# Patient Record
Sex: FEMALE | Race: OTHER | Employment: FULL TIME | URBAN - METROPOLITAN AREA
[De-identification: names, ages, dates, MRNs, and addresses within clinical notes are randomized per-mention and may not be internally consistent; named-entity substitution may affect disease eponyms.]

---

## 2017-09-25 PROBLEM — N92.0 MENORRHAGIA WITH REGULAR CYCLE: Status: ACTIVE | Noted: 2017-09-25

## 2017-09-29 ENCOUNTER — HOSPITAL ENCOUNTER (OUTPATIENT)
Dept: MAMMOGRAPHY | Age: 42
Discharge: HOME | End: 2017-09-29
Attending: OBSTETRICS & GYNECOLOGY
Payer: COMMERCIAL

## 2017-09-29 DIAGNOSIS — Z12.31 VISIT FOR SCREENING MAMMOGRAM: ICD-10-CM

## 2017-09-29 PROCEDURE — 77067 SCR MAMMO BI INCL CAD: CPT

## 2017-10-19 NOTE — H&P
Gynecology History and Physical    Name: Eladio Light MRN: [id number] SSN: [id number]    YOB: 1975  Age: 38 y.o. Sex: female       Subjective:      Chief complaint:  Abnormal uterine bleeding    Lazarus Landmark is a 38 y.o.  female with a history of abnormal uterine bleeding. Previous workup included Ultrasound which was unremarkable. Previous treatment measures included hormone therapy and nonsteroidal anti-inflammatory drugs (NSAIDs). She is admitted for Procedure(s) (LRB):  DILATATION AND CURETTAGE HYSTEROSCOPY ENDOMETRIAL ABLATION/NOVASURE (N/A). The current method of family planning is Essure. OB History      Para Term  AB Living    4 2 2  2 2    SAB TAB Ectopic Molar Multiple Live Births    1  1   2        Past Medical History:   Diagnosis Date    Acquired hypothyroidism     history of hypothyroidism, resolved after getting pregnancy    Heavy periods     History of ectopic pregnancy 2009    methotrexate    Infertility     famera, clomid, injections, IUI     Past Surgical History:   Procedure Laterality Date     DELIVERY ONLY      HX APPENDECTOMY  13    HX  SECTION  2011    HX GYN      Essure    HX OTHER SURGICAL  2009    nasal surgery     Social History     Occupational History    Not on file. Social History Main Topics    Smoking status: Never Smoker    Smokeless tobacco: Never Used    Alcohol use Yes      Comment: occ    Drug use: No    Sexual activity: Yes     Partners: Male     Birth control/ protection: Surgical      Comment: essure      Family History   Problem Relation Age of Onset    Diabetes Father     Cancer Paternal Aunt      breast    Breast Cancer Neg Hx         No Known Allergies  Prior to Admission medications    Medication Sig Start Date End Date Taking? Authorizing Provider   phentermine (ADIPEX-P) 37.5 mg tablet Take 37.5 mg by mouth every morning.     Historical Provider        Review of Systems:  A comprehensive review of systems was negative except for that written in the History of Present Illness. Objective: There were no vitals filed for this visit. Physical Exam:  Patient without distress. Heart: Regular rate and rhythm  Lung: clear to auscultation throughout lung fields, no wheezes, no rales, no rhonchi and normal respiratory effort    Assessment:     Active Problems:    * No active hospital problems. *     Abnormal uterine bleeding     Plan:     Procedure(s) (LRB):  DILATATION AND CURETTAGE HYSTEROSCOPY ENDOMETRIAL ABLATION/NOVASURE (N/A)  Discussed the risks of surgery including the risks of bleeding, infection, deep vein thrombosis, and surgical injuries to internal organs including but not limited to the bowels, bladder, rectum, and female reproductive organs. The patient understands the risks; any and all questions were answered to the patient's satisfaction.     Signed By:  Yadi Abebe MD     October 19, 2017

## 2017-10-24 ENCOUNTER — HOSPITAL ENCOUNTER (OUTPATIENT)
Dept: SURGERY | Age: 42
Discharge: HOME | End: 2017-10-24

## 2017-10-27 VITALS — BODY MASS INDEX: 24.75 KG/M2 | WEIGHT: 145 LBS | HEIGHT: 64 IN

## 2017-10-27 RX ORDER — BISMUTH SUBSALICYLATE 262 MG
1 TABLET,CHEWABLE ORAL
COMMUNITY
End: 2017-11-20

## 2017-10-27 NOTE — PERIOP NOTES
Patient verified name, , and surgery as listed in [facility]. Type 1B surgery, phone assessment complete. Orders in EMR- received. Labs per surgeon: none  Labs per anesthesia protocol: none; hgb collected 17- result WNL    Patient answered medical/surgical history questions at their best of ability. All prior to admission medications documented in [facility]. Patient instructed to take the following medications the day of surgery according to anesthesia guidelines with a small sip of water: none. Hold all vitamins 7 days prior to surgery and NSAIDS 5 days prior to surgery. Medications to be held- vitamin. Patient instructed on the following:  Arrive at A Entrance, time of arrival to be called the day before by 1700  NPO after midnight including gum, mints, and ice chips  Responsible adult must drive patient to the hospital, stay during surgery, and patient will  need supervision 24 hours after anesthesia  Use antibacterial soap in shower the night before surgery and on the morning of surgery  Leave all valuables (money and jewelry) at home but bring insurance card and ID on  DOS  Do not wear make-up, nail polish, lotions, cologne, perfumes, powders, or oil on skin. Patient teach back successful and patient demonstrates knowledge of instruction.

## 2017-10-30 ENCOUNTER — ANESTHESIA EVENT (OUTPATIENT)
Dept: SURGERY | Age: 42
End: 2017-10-30
Payer: COMMERCIAL

## 2017-10-31 ENCOUNTER — HOSPITAL ENCOUNTER (OUTPATIENT)
Age: 42
Setting detail: OUTPATIENT SURGERY
Discharge: HOME | End: 2017-10-31
Attending: OBSTETRICS & GYNECOLOGY | Admitting: OBSTETRICS & GYNECOLOGY
Payer: COMMERCIAL

## 2017-10-31 ENCOUNTER — ANESTHESIA (OUTPATIENT)
Dept: SURGERY | Age: 42
End: 2017-10-31
Payer: COMMERCIAL

## 2017-10-31 VITALS
TEMPERATURE: 98.3 F | OXYGEN SATURATION: 99 % | HEART RATE: 62 BPM | DIASTOLIC BLOOD PRESSURE: 75 MMHG | BODY MASS INDEX: 25.5 KG/M2 | WEIGHT: 148.56 LBS | RESPIRATION RATE: 16 BRPM | SYSTOLIC BLOOD PRESSURE: 117 MMHG

## 2017-10-31 DIAGNOSIS — N92.0 MENORRHAGIA WITH REGULAR CYCLE: Primary | ICD-10-CM

## 2017-10-31 LAB — HCG UR QL: NEGATIVE

## 2017-10-31 PROCEDURE — 74011250636 HC RX REV CODE- 250/636

## 2017-10-31 PROCEDURE — 77030020782 HC GWN BAIR PAWS FLX 3M -B: Performed by: ANESTHESIOLOGY

## 2017-10-31 PROCEDURE — 77030034928 HC DEV UTER SURSND KT HOLO -G1: Performed by: OBSTETRICS & GYNECOLOGY

## 2017-10-31 PROCEDURE — 76210000016 HC OR PH I REC 1 TO 1.5 HR: Performed by: OBSTETRICS & GYNECOLOGY

## 2017-10-31 PROCEDURE — 77030018836 HC SOL IRR NACL ICUM -A: Performed by: OBSTETRICS & GYNECOLOGY

## 2017-10-31 PROCEDURE — 76210000020 HC REC RM PH II FIRST 0.5 HR: Performed by: OBSTETRICS & GYNECOLOGY

## 2017-10-31 PROCEDURE — 76060000032 HC ANESTHESIA 0.5 TO 1 HR: Performed by: OBSTETRICS & GYNECOLOGY

## 2017-10-31 PROCEDURE — 81025 URINE PREGNANCY TEST: CPT

## 2017-10-31 PROCEDURE — 74011000250 HC RX REV CODE- 250

## 2017-10-31 PROCEDURE — 77030020143 HC AIRWY LARYN INTUB CGAS -A: Performed by: ANESTHESIOLOGY

## 2017-10-31 PROCEDURE — 76010000138 HC OR TIME 0.5 TO 1 HR: Performed by: OBSTETRICS & GYNECOLOGY

## 2017-10-31 PROCEDURE — 88305 TISSUE EXAM BY PATHOLOGIST: CPT | Performed by: OBSTETRICS & GYNECOLOGY

## 2017-10-31 PROCEDURE — 74011250636 HC RX REV CODE- 250/636: Performed by: ANESTHESIOLOGY

## 2017-10-31 RX ORDER — SODIUM CHLORIDE 0.9 % (FLUSH) 0.9 %
5-10 SYRINGE (ML) INJECTION EVERY 8 HOURS
Status: DISCONTINUED | OUTPATIENT
Start: 2017-10-31 | End: 2017-10-31 | Stop reason: HOSPADM

## 2017-10-31 RX ORDER — KETOROLAC TROMETHAMINE 30 MG/ML
INJECTION, SOLUTION INTRAMUSCULAR; INTRAVENOUS AS NEEDED
Status: DISCONTINUED | OUTPATIENT
Start: 2017-10-31 | End: 2017-10-31 | Stop reason: HOSPADM

## 2017-10-31 RX ORDER — OXYCODONE HYDROCHLORIDE 5 MG/1
5 TABLET ORAL
Status: DISCONTINUED | OUTPATIENT
Start: 2017-10-31 | End: 2017-10-31 | Stop reason: HOSPADM

## 2017-10-31 RX ORDER — PROPOFOL 10 MG/ML
INJECTION, EMULSION INTRAVENOUS AS NEEDED
Status: DISCONTINUED | OUTPATIENT
Start: 2017-10-31 | End: 2017-10-31 | Stop reason: HOSPADM

## 2017-10-31 RX ORDER — LIDOCAINE HYDROCHLORIDE 20 MG/ML
INJECTION, SOLUTION EPIDURAL; INFILTRATION; INTRACAUDAL; PERINEURAL AS NEEDED
Status: DISCONTINUED | OUTPATIENT
Start: 2017-10-31 | End: 2017-10-31 | Stop reason: HOSPADM

## 2017-10-31 RX ORDER — HYDROMORPHONE HYDROCHLORIDE 2 MG/ML
0.5 INJECTION, SOLUTION INTRAMUSCULAR; INTRAVENOUS; SUBCUTANEOUS
Status: DISCONTINUED | OUTPATIENT
Start: 2017-10-31 | End: 2017-10-31 | Stop reason: HOSPADM

## 2017-10-31 RX ORDER — OXYCODONE AND ACETAMINOPHEN 5; 325 MG/1; MG/1
1 TABLET ORAL
Qty: 8 TAB | Refills: 0 | Status: SHIPPED | OUTPATIENT
Start: 2017-10-31 | End: 2017-11-20

## 2017-10-31 RX ORDER — SODIUM CHLORIDE 0.9 % (FLUSH) 0.9 %
5-10 SYRINGE (ML) INJECTION AS NEEDED
Status: DISCONTINUED | OUTPATIENT
Start: 2017-10-31 | End: 2017-10-31 | Stop reason: HOSPADM

## 2017-10-31 RX ORDER — ONDANSETRON 2 MG/ML
INJECTION INTRAMUSCULAR; INTRAVENOUS AS NEEDED
Status: DISCONTINUED | OUTPATIENT
Start: 2017-10-31 | End: 2017-10-31 | Stop reason: HOSPADM

## 2017-10-31 RX ORDER — OXYCODONE AND ACETAMINOPHEN 10; 325 MG/1; MG/1
1 TABLET ORAL AS NEEDED
Status: DISCONTINUED | OUTPATIENT
Start: 2017-10-31 | End: 2017-10-31 | Stop reason: HOSPADM

## 2017-10-31 RX ORDER — FENTANYL CITRATE 50 UG/ML
INJECTION, SOLUTION INTRAMUSCULAR; INTRAVENOUS AS NEEDED
Status: DISCONTINUED | OUTPATIENT
Start: 2017-10-31 | End: 2017-10-31 | Stop reason: HOSPADM

## 2017-10-31 RX ORDER — DEXAMETHASONE SODIUM PHOSPHATE 4 MG/ML
INJECTION, SOLUTION INTRA-ARTICULAR; INTRALESIONAL; INTRAMUSCULAR; INTRAVENOUS; SOFT TISSUE AS NEEDED
Status: DISCONTINUED | OUTPATIENT
Start: 2017-10-31 | End: 2017-10-31 | Stop reason: HOSPADM

## 2017-10-31 RX ORDER — SODIUM CHLORIDE, SODIUM LACTATE, POTASSIUM CHLORIDE, CALCIUM CHLORIDE 600; 310; 30; 20 MG/100ML; MG/100ML; MG/100ML; MG/100ML
75 INJECTION, SOLUTION INTRAVENOUS CONTINUOUS
Status: DISCONTINUED | OUTPATIENT
Start: 2017-10-31 | End: 2017-10-31 | Stop reason: HOSPADM

## 2017-10-31 RX ADMIN — DEXAMETHASONE SODIUM PHOSPHATE 4 MG: 4 INJECTION, SOLUTION INTRA-ARTICULAR; INTRALESIONAL; INTRAMUSCULAR; INTRAVENOUS; SOFT TISSUE at 08:38

## 2017-10-31 RX ADMIN — PROPOFOL 200 MG: 10 INJECTION, EMULSION INTRAVENOUS at 08:35

## 2017-10-31 RX ADMIN — ONDANSETRON 4 MG: 2 INJECTION INTRAMUSCULAR; INTRAVENOUS at 08:38

## 2017-10-31 RX ADMIN — FENTANYL CITRATE 25 MCG: 50 INJECTION, SOLUTION INTRAMUSCULAR; INTRAVENOUS at 08:45

## 2017-10-31 RX ADMIN — SODIUM CHLORIDE, SODIUM LACTATE, POTASSIUM CHLORIDE, AND CALCIUM CHLORIDE 75 ML/HR: 600; 310; 30; 20 INJECTION, SOLUTION INTRAVENOUS at 07:19

## 2017-10-31 RX ADMIN — HYDROMORPHONE HYDROCHLORIDE 0.5 MG: 2 INJECTION, SOLUTION INTRAMUSCULAR; INTRAVENOUS; SUBCUTANEOUS at 09:45

## 2017-10-31 RX ADMIN — LIDOCAINE HYDROCHLORIDE 80 MG: 20 INJECTION, SOLUTION EPIDURAL; INFILTRATION; INTRACAUDAL; PERINEURAL at 08:35

## 2017-10-31 RX ADMIN — FENTANYL CITRATE 50 MCG: 50 INJECTION, SOLUTION INTRAMUSCULAR; INTRAVENOUS at 08:37

## 2017-10-31 RX ADMIN — FENTANYL CITRATE 25 MCG: 50 INJECTION, SOLUTION INTRAMUSCULAR; INTRAVENOUS at 08:56

## 2017-10-31 RX ADMIN — KETOROLAC TROMETHAMINE 30 MG: 30 INJECTION, SOLUTION INTRAMUSCULAR; INTRAVENOUS at 08:38

## 2017-10-31 NOTE — OP NOTES
HYSTEROSCOPY D & C WITH ENDOMETRIAL ABLATION FULL OP NOTE    Cher Lutz  [id number]    DATE OF PROCEDURE:  10/31/2017    PREOPERATIVE DIAGNOSIS:  Menorrhagia with regular cycle [N92.0]    POSTOPERATIVE DIAGNOSIS:  Menorrhagia with regular cycle [N92.0]    PROCEDURE: Hysteroscopy, dilatation & curettage, NovaSure ablation. SURGEON:  Maty Shin MD    ASSISTANT:  none    ANESTHESIA:   General endotracheal anesthesia. EBL: 5 cc    FINDINGS: normal cavity and good post ablation appearance    PROCEDURE: Patient was placed on the operating table in the supine position. Time out was done to confirm the operating procedure, surgeon, patient and site. Once confirmed by the team, procedure was started. Patient was placed under general endotracheal anesthesia. She was prepped and draped in the usual fashion for vaginal surgery. Cervix was visualized with the aid of a weighted vaginal speculum and grasped with a single-tooth tenaculum and sounded to 10 cm. The cervix was then dilated to 23-Latvian. The diagnostic hysteroscope was then placed in the endometrial cavity. Thorough endometrial curettage was performed with endometrial curettings being sent for histologic review, using a medium sharp curette. The NovaSure ablation device was then opened. The uterus sounded to 10 cm with cervical length of 5 cm. The settings on the NovaSure ablation were set for a 1ength of 5 cm and a width of 4.7 cm with treatment for 56 sec at a power level of 129 after the instrument was placed and tested. The NovaSure ablater was then removed. Repeat hysteroscopy revealed a very adequate endometrial ablation. There was no bleeding. Instruments were removed. The patient went to the recovery room in satisfactory condition. She was given a prescription for percocet 5 #8, is to follow up in two weeks, and will call with any increased pain, fever, or bleeding.

## 2017-10-31 NOTE — ANESTHESIA PREPROCEDURE EVALUATION
Anesthetic History   No history of anesthetic complications            Review of Systems / Medical History  Patient summary reviewed and pertinent labs reviewed    Pulmonary  Within defined limits                 Neuro/Psych   Within defined limits           Cardiovascular                  Exercise tolerance: >4 METS     GI/Hepatic/Renal  Within defined limits              Endo/Other      Hypothyroidism      Comments: Hypothyroidism during one pregnancy per pt.  Resolved now Other Findings              Physical Exam    Airway  Mallampati: II  TM Distance: > 6 cm  Neck ROM: normal range of motion   Mouth opening: Normal     Cardiovascular    Rhythm: regular           Dental  No notable dental hx       Pulmonary                 Abdominal  GI exam deferred       Other Findings            Anesthetic Plan    ASA: 2  Anesthesia type: general          Induction: Intravenous  Anesthetic plan and risks discussed with: Patient

## 2017-10-31 NOTE — IP AVS SNAPSHOT
[phone] 
[phone] Patient: Davida Anguiano MRN: [id number] :1975 About your hospitalization You were admitted on:  2017 You last received care in the:  [facility] PACU You were discharged on:  2017 Why you were hospitalized Your primary diagnosis was:  Not on File Things You Need To Do (next 8 weeks) Follow up with Yadi Abebe MD  
  
Phone:  [phone] Where:  [address] [place] [address], [address], [address] [place] [address]  POST OP with Yadi Abebe MD at  1:45 PM  
Where:  [address] Discharge Orders Procedure Order Date Status Priority Quantity Spec Type Associated Dx ACTIVITY AFTER DISCHARGE Patient should: Restrict sexual activity. , Restrict lifting, Restrict driving 44 8504 Normal Routine 1 Questions: Patient should:  Restrict sexual activity. Patient should:  Restrict lifting Patient should:  Restrict driving DIET REGULAR No added salt 10/31/17 0903 Normal Routine 1 Questions: Additional options:  No added salt A check ashley indicates which time of day the medication should be taken. My Medications TAKE these medications as instructed Instructions Each Dose to Equal  
 Morning Noon Evening Bedtime  
 multivitamin tablet Commonly known as:  ONE A DAY Your last dose was: Your next dose is: Take 1 Tab by mouth daily as needed. 1 Tab  
    
   
   
   
  
 oxyCODONE-acetaminophen 5-325 mg per tablet Commonly known as:  PERCOCET Your last dose was: Your next dose is: Take 1 Tab by mouth every four (4) hours as needed for Pain. Max Daily Amount: 6 Tabs. 1 Tab  
    
   
   
   
  
 phentermine 37.5 mg tablet Commonly known as:  ADIPEX-P  
 Your last dose was: Your next dose is: Take 37.5 mg by mouth every morning. 37.5 mg Where to Get Your Medications Information on where to get these meds will be given to you by the nurse or doctor. ! Ask your nurse or doctor about these medications  
  oxyCODONE-acetaminophen 5-325 mg per tablet Discharge Instructions INSTRUCTIONS FOLLOWING HYSTEROSCOPY 
 
ACTIVITY  Limited activity today; increase as tolerated tomorrow, but no vigorous exercise  Shower only; no tub baths  No douches, tampons or intercourse until your doctor releases you (at least 2 weeks)  May return to work or school as directed by your doctor DIET  Clear liquids until no nausea or vomiting  Advance to regular diet as tolerated PAIN 
 Expect a moderate amount of pain.  Take pain medication as directed by your doctor. If no prescription for pain is sent home with you, take the appropriate dose of your commonly used pain medication.  Call your doctor if pain is NOT relieved by medication.  DO NOT take aspirin or blood thinners until directed by your doctor. FOLLOW-UP PHONE CALLS  Calls will be made by nursing staff  If you have any problems, call your doctor as needed. CALL YOUR DOCTOR IF 
 Excessive bleeding that soaks a pad in an hour  Temperature of 101°F or above  Green or yellow, smelly discharge  Unable to urinate by bedtime  Nausea and vomiting that does not stop by bedtime AFTER ANESTHESIA  For the first 24 hours: DO NOT Drive, Drink alcoholic beverages, or Make important decisions.  Beware of dizziness following anesthesia and while taking pain medication.  Plan to stay tonight within 1 hours drive of the hospital 
 
 
  
  
  
Introducing hospitals & HEALTH SERVICES!    
 Danny Shaffer introduces [employer] patient portal. Now you can access parts of your medical record, email your doctor's office, and request medication refills online. 1. In your internet browser, go to [email or web address] 2. Click on the First Time User? Click Here link in the Sign In box. You will see the New Member Sign Up page. 3. Enter your [employer] Access Code exactly as it appears below. You will not need to use this code after youve completed the sign-up process. If you do not sign up before the expiration date, you must request a new code. · [employer] Access Code: [email or web address] Expires: 1/28/2018  6:16 PM 
 
4. Enter the last four digits of your Social Security Number (xxxx) and Date of Birth (mm/dd/yyyy) as indicated and click Submit. You will be taken to the next sign-up page. 5. Create a [employer] ID. This will be your [employer] login ID and cannot be changed, so think of one that is secure and easy to remember. 6. Create a [employer] password. You can change your password at any time. 7. Enter your Password Reset Question and Answer. This can be used at a later time if you forget your password. 8. Enter your e-mail address. You will receive e-mail notification when new information is available in [address] 9. Click Sign Up. You can now view and download portions of your medical record. 10. Click the Download Summary menu link to download a portable copy of your medical information. If you have questions, please visit the Frequently Asked Questions section of the [employer] website. Remember, [employer] is NOT to be used for urgent needs. For medical emergencies, dial 911. Now available from your iPhone and Android! Providers Seen During Your Hospitalization Provider Specialty Primary office phone Orville Madrigal MD Obstetrics & Gynecology [phone] Your Primary Care Physician (PCP) Primary Care Physician Office Phone Office Fax Donnie Monahan [phone] You are allergic to the following No active allergies Recent Documentation Weight BMI OB Status Smoking Status 67.4 kg 25.5 kg/m2 Having regular periods Never Smoker Emergency Contacts Name Discharge Info Relation Home Work Mobile Floyd Rico  Spouse [3] [phone] Patient Belongings The following personal items are in your possession at time of discharge: 
                             
 
  
  
 Please provide this summary of care documentation to your next provider. Signatures-by signing, you are acknowledging that this After Visit Summary has been reviewed with you and you have received a copy. Patient Signature:  ____________________________________________________________ Date:  ____________________________________________________________  
  
[facility] Provider Signature:  ____________________________________________________________ Date:  ____________________________________________________________

## 2017-10-31 NOTE — ANESTHESIA POSTPROCEDURE EVALUATION
Post-Anesthesia Evaluation and Assessment    Patient: Jay Evans MRN: [id number]  SSN: [id number]    YOB: 1975  Age: 38 y.o. Sex: female       Cardiovascular Function/Vital Signs  Visit Vitals    /79 (BP 1 Location: Left arm, BP Patient Position: At rest)    Pulse 62    Temp 36.8 °C (98.3 °F)    Resp 16    Wt 67.4 kg (148 lb 9 oz)    SpO2 99%    BMI 25.5 kg/m2       Patient is status post general anesthesia for Procedure(s):  DILATATION AND CURETTAGE HYSTEROSCOPY ENDOMETRIAL ABLATION/NOVASURE. Nausea/Vomiting: None    Postoperative hydration reviewed and adequate. Pain:  Pain Scale 1: Visual (10/31/17 0909)  Pain Intensity 1: 0 (10/31/17 0909)   Managed    Neurological Status:   Neuro (WDL): Exceptions to [facility] (10/31/17 0909)  Neuro  Neurologic State: Drowsy (10/31/17 6074)  Orientation Level: Oriented to person (10/31/17 0909)  Cognition: Follows commands (10/31/17 0909)  LUE Motor Response: Purposeful (10/31/17 0909)  LLE Motor Response: Purposeful (10/31/17 0909)  RUE Motor Response: Purposeful (10/31/17 0909)  RLE Motor Response: Purposeful (10/31/17 0909)   At baseline    Mental Status and Level of Consciousness: Arousable    Pulmonary Status:   O2 Device: Room air (10/31/17 0954)   Adequate oxygenation and airway patent    Complications related to anesthesia: None    Post-anesthesia assessment completed.  No concerns    Signed By: Abby Burroughs MD     October 31, 2017

## 2018-04-16 ENCOUNTER — HOSPITAL ENCOUNTER (EMERGENCY)
Age: 43
Discharge: HOME | End: 2018-04-16
Attending: EMERGENCY MEDICINE
Payer: COMMERCIAL

## 2018-04-16 ENCOUNTER — APPOINTMENT (OUTPATIENT)
Dept: GENERAL RADIOLOGY | Age: 43
End: 2018-04-16
Attending: EMERGENCY MEDICINE
Payer: COMMERCIAL

## 2018-04-16 VITALS
WEIGHT: 150 LBS | RESPIRATION RATE: 16 BRPM | DIASTOLIC BLOOD PRESSURE: 70 MMHG | OXYGEN SATURATION: 100 % | BODY MASS INDEX: 25.61 KG/M2 | SYSTOLIC BLOOD PRESSURE: 128 MMHG | HEART RATE: 65 BPM | HEIGHT: 64 IN | TEMPERATURE: 97.8 F

## 2018-04-16 DIAGNOSIS — R07.9 CHEST PAIN, UNSPECIFIED TYPE: Primary | ICD-10-CM

## 2018-04-16 LAB
ALBUMIN SERPL-MCNC: 3.7 G/DL (ref 3.5–5)
ALBUMIN/GLOB SERPL: 1 {RATIO} (ref 1.2–3.5)
ALP SERPL-CCNC: 57 U/L (ref 50–136)
ALT SERPL-CCNC: 39 U/L (ref 12–65)
ANION GAP SERPL CALC-SCNC: 7 MMOL/L (ref 7–16)
AST SERPL-CCNC: 25 U/L (ref 15–37)
ATRIAL RATE: 70 BPM
BACTERIA URNS QL MICRO: ABNORMAL /HPF
BASOPHILS # BLD: 0 K/UL (ref 0–0.2)
BASOPHILS NFR BLD: 0 % (ref 0–2)
BILIRUB SERPL-MCNC: 0.2 MG/DL (ref 0.2–1.1)
BUN SERPL-MCNC: 14 MG/DL (ref 6–23)
CALCIUM SERPL-MCNC: 8.5 MG/DL (ref 8.3–10.4)
CALCULATED P AXIS, ECG09: -16 DEGREES
CALCULATED R AXIS, ECG10: 75 DEGREES
CALCULATED T AXIS, ECG11: 59 DEGREES
CASTS URNS QL MICRO: ABNORMAL /LPF
CHLORIDE SERPL-SCNC: 108 MMOL/L (ref 98–107)
CO2 SERPL-SCNC: 24 MMOL/L (ref 21–32)
CREAT SERPL-MCNC: 0.63 MG/DL (ref 0.6–1)
D DIMER PPP FEU-MCNC: 0.33 UG/ML(FEU)
DIAGNOSIS, 93000: NORMAL
DIFFERENTIAL METHOD BLD: NORMAL
EOSINOPHIL # BLD: 0.1 K/UL (ref 0–0.8)
EOSINOPHIL NFR BLD: 1 % (ref 0.5–7.8)
EPI CELLS #/AREA URNS HPF: ABNORMAL /HPF
ERYTHROCYTE [DISTWIDTH] IN BLOOD BY AUTOMATED COUNT: 12.6 % (ref 11.9–14.6)
GLOBULIN SER CALC-MCNC: 3.8 G/DL (ref 2.3–3.5)
GLUCOSE SERPL-MCNC: 108 MG/DL (ref 65–100)
HCT VFR BLD AUTO: 38.6 % (ref 35.8–46.3)
HGB BLD-MCNC: 13.1 G/DL (ref 11.7–15.4)
IMM GRANULOCYTES # BLD: 0 K/UL (ref 0–0.5)
IMM GRANULOCYTES NFR BLD AUTO: 0 % (ref 0–5)
LIPASE SERPL-CCNC: 180 U/L (ref 73–393)
LYMPHOCYTES # BLD: 2.5 K/UL (ref 0.5–4.6)
LYMPHOCYTES NFR BLD: 32 % (ref 13–44)
MCH RBC QN AUTO: 31.6 PG (ref 26.1–32.9)
MCHC RBC AUTO-ENTMCNC: 33.9 G/DL (ref 31.4–35)
MCV RBC AUTO: 93.2 FL (ref 79.6–97.8)
MONOCYTES # BLD: 0.7 K/UL (ref 0.1–1.3)
MONOCYTES NFR BLD: 8 % (ref 4–12)
NEUTS SEG # BLD: 4.5 K/UL (ref 1.7–8.2)
NEUTS SEG NFR BLD: 59 % (ref 43–78)
P-R INTERVAL, ECG05: 162 MS
PLATELET # BLD AUTO: 270 K/UL (ref 150–450)
PMV BLD AUTO: 11.3 FL (ref 10.8–14.1)
POTASSIUM SERPL-SCNC: 4.1 MMOL/L (ref 3.5–5.1)
PROT SERPL-MCNC: 7.5 G/DL (ref 6.3–8.2)
Q-T INTERVAL, ECG07: 396 MS
QRS DURATION, ECG06: 94 MS
QTC CALCULATION (BEZET), ECG08: 427 MS
RBC # BLD AUTO: 4.14 M/UL (ref 4.05–5.25)
RBC #/AREA URNS HPF: ABNORMAL /HPF
SODIUM SERPL-SCNC: 139 MMOL/L (ref 136–145)
TROPONIN I BLD-MCNC: 0 NG/ML (ref 0.02–0.05)
TROPONIN I BLD-MCNC: 0 NG/ML (ref 0.02–0.05)
TROPONIN I SERPL-MCNC: <0.02 NG/ML (ref 0.02–0.05)
VENTRICULAR RATE, ECG03: 70 BPM
WBC # BLD AUTO: 7.8 K/UL (ref 4.3–11.1)
WBC URNS QL MICRO: ABNORMAL /HPF

## 2018-04-16 PROCEDURE — 85025 COMPLETE CBC W/AUTO DIFF WBC: CPT | Performed by: EMERGENCY MEDICINE

## 2018-04-16 PROCEDURE — 85379 FIBRIN DEGRADATION QUANT: CPT | Performed by: EMERGENCY MEDICINE

## 2018-04-16 PROCEDURE — 81003 URINALYSIS AUTO W/O SCOPE: CPT | Performed by: EMERGENCY MEDICINE

## 2018-04-16 PROCEDURE — 83690 ASSAY OF LIPASE: CPT | Performed by: EMERGENCY MEDICINE

## 2018-04-16 PROCEDURE — 84484 ASSAY OF TROPONIN QUANT: CPT

## 2018-04-16 PROCEDURE — 81015 MICROSCOPIC EXAM OF URINE: CPT | Performed by: EMERGENCY MEDICINE

## 2018-04-16 PROCEDURE — 84484 ASSAY OF TROPONIN QUANT: CPT | Performed by: EMERGENCY MEDICINE

## 2018-04-16 PROCEDURE — 99285 EMERGENCY DEPT VISIT HI MDM: CPT | Performed by: EMERGENCY MEDICINE

## 2018-04-16 PROCEDURE — 71046 X-RAY EXAM CHEST 2 VIEWS: CPT

## 2018-04-16 PROCEDURE — 93005 ELECTROCARDIOGRAM TRACING: CPT | Performed by: EMERGENCY MEDICINE

## 2018-04-16 PROCEDURE — 80053 COMPREHEN METABOLIC PANEL: CPT | Performed by: EMERGENCY MEDICINE

## 2018-04-16 RX ORDER — NITROFURANTOIN 25; 75 MG/1; MG/1
100 CAPSULE ORAL 2 TIMES DAILY
Qty: 14 CAP | Refills: 0 | Status: SHIPPED | OUTPATIENT
Start: 2018-04-16 | End: 2018-04-23

## 2018-04-16 NOTE — ED NOTES
I have reviewed discharge instructions with the patient. The patient verbalized understanding. Patient left ED via Discharge Method: ambulatory to Home with (insert name of family/friend, self, transport self). Opportunity for questions and clarification provided. Patient given 1 scripts. To continue your aftercare when you leave the hospital, you may receive an automated call from our care team to check in on how you are doing. This is a free service and part of our promise to provide the best care and service to meet your aftercare needs.  If you have questions, or wish to unsubscribe from this service please call [phone]. Thank you for Choosing our [facility] Emergency Department.

## 2018-04-16 NOTE — ED PROVIDER NOTES
HPI Comments: Seen as provider in triage. Pt developed pain under right breast/back while sitting down for lunch. She got up and walked around which caused worsening pain radiating up to chest. Described as pressure like with SOB and nausea. No diaphoresis. Resolved after 20 min. No fever or cough. given ASA at urgent care. No further testing done. No similar symptoms in past. No medical problems.  had cardiac stent placed in . No tenderness on exam. Labs, EKG, and cxr ordered. Waiting room placement. Nicolas Schmidt MD; 2018 @1:33 PM===========================================      3:53 PM care assumed, agree with history above with no significant additions on my history. Patient is a 38 y.o. female presenting with chest pain. The history is provided by the patient. Chest Pain (Angina)    Pertinent negatives include no abdominal pain, no back pain, no cough, no fever, no headaches, no nausea, no shortness of breath, no vomiting and no weakness. Past Medical History:   Diagnosis Date    Acquired hypothyroidism     history of hypothyroidism, resolved after getting pregnancy    Heavy periods     History of ectopic pregnancy     methotrexate    Infertility     famera, clomid, injections, IUI       Past Surgical History:   Procedure Laterality Date    HX APPENDECTOMY  13    HX  SECTION  04/19/2011    x2    HX DILATION AND CURETTAGE      HX GYN      Essure    HX HYSTEROSCOPY WITH ENDOMETRIAL ABLATION      HX OTHER SURGICAL  2009    nasal surgery    HX REFRACTIVE SURGERY Bilateral          Family History:   Problem Relation Age of Onset    Diabetes Father     Cancer Paternal Aunt      breast    Breast Cancer Neg Hx        Social History     Social History    Marital status:      Spouse name: N/A    Number of children: N/A    Years of education: N/A     Occupational History    Not on file.      Social History Main Topics    Smoking status: Never Smoker  Smokeless tobacco: Never Used    Alcohol use Yes      Comment: socially    Drug use: No    Sexual activity: Yes     Partners: Male     Birth control/ protection: Surgical      Comment: essure      Other Topics Concern    Not on file     Social History Narrative         ALLERGIES: Review of patient's allergies indicates no known allergies. Review of Systems   Constitutional: Negative for chills and fever. HENT: Negative for rhinorrhea and sore throat. Eyes: Negative for visual disturbance. Respiratory: Negative for cough and shortness of breath. Cardiovascular: Positive for chest pain. Negative for leg swelling. Gastrointestinal: Negative for abdominal pain, diarrhea, nausea and vomiting. Genitourinary: Negative for dysuria. Musculoskeletal: Negative for back pain and neck pain. Skin: Negative for rash. Neurological: Negative for weakness and headaches. Psychiatric/Behavioral: The patient is not nervous/anxious. Vitals:    04/16/18 1324   BP: 111/72   Pulse: 73   Resp: 18   Temp: 97.8 °F (36.6 °C)   SpO2: 100%   Weight: 68 kg (150 lb)   Height: 5' 4\" (1.626 m)            Physical Exam   Constitutional: She is oriented to person, place, and time. She appears well-developed and well-nourished. HENT:   Head: Normocephalic. Right Ear: External ear normal.   Left Ear: External ear normal.   Eyes: Conjunctivae and EOM are normal. Pupils are equal, round, and reactive to light. Neck: Normal range of motion. Neck supple. No tracheal deviation present. Cardiovascular: Normal rate, regular rhythm, normal heart sounds and intact distal pulses. No murmur heard. Pulmonary/Chest: Effort normal and breath sounds normal. No respiratory distress. Abdominal: Soft. There is no tenderness. Musculoskeletal: Normal range of motion. Neurological: She is alert and oriented to person, place, and time. No cranial nerve deficit. Skin: No rash noted.    Nursing note and vitals reviewed. MDM  Number of Diagnoses or Management Options  Chest pain, unspecified type: new and requires workup     Amount and/or Complexity of Data Reviewed  Clinical lab tests: reviewed and ordered  Tests in the radiology section of CPT®: ordered and reviewed  Tests in the medicine section of CPT®: ordered and reviewed  Review and summarize past medical records: yes    Risk of Complications, Morbidity, and/or Mortality  Presenting problems: high  Diagnostic procedures: high  Management options: high    Patient Progress  Patient progress: stable        ED Course       Procedures    Recent Results (from the past 12 hour(s))   EKG, 12 LEAD, INITIAL    Collection Time: 04/16/18  1:29 PM   Result Value Ref Range    Ventricular Rate 70 BPM    Atrial Rate 70 BPM    P-R Interval 162 ms    QRS Duration 94 ms    Q-T Interval 396 ms    QTC Calculation (Bezet) 427 ms    Calculated P Axis -16 degrees    Calculated R Axis 75 degrees    Calculated T Axis 59 degrees    Diagnosis       Normal sinus rhythm  RSR' or QR pattern in V1 suggests right ventricular conduction delay  Septal infarct , age undetermined  Abnormal ECG  No previous ECGs available  Confirmed by ST JASKARAN ELIZONDO MD (), JOHNATHAN LOPEZ ([id number]) on 4/16/2018 3:26:45 PM     CBC WITH AUTOMATED DIFF    Collection Time: 04/16/18  1:34 PM   Result Value Ref Range    WBC 7.8 4.3 - 11.1 K/uL    RBC 4.14 4.05 - 5.25 M/uL    HGB 13.1 11.7 - 15.4 g/dL    HCT 38.6 35.8 - 46.3 %    MCV 93.2 79.6 - 97.8 FL    MCH 31.6 26.1 - 32.9 PG    MCHC 33.9 31.4 - 35.0 g/dL    RDW 12.6 11.9 - 14.6 %    PLATELET 312 683 - 128 K/uL    MPV 11.3 10.8 - 14.1 FL    DF AUTOMATED      NEUTROPHILS 59 43 - 78 %    LYMPHOCYTES 32 13 - 44 %    MONOCYTES 8 4.0 - 12.0 %    EOSINOPHILS 1 0.5 - 7.8 %    BASOPHILS 0 0.0 - 2.0 %    IMMATURE GRANULOCYTES 0 0.0 - 5.0 %    ABS. NEUTROPHILS 4.5 1.7 - 8.2 K/UL    ABS. LYMPHOCYTES 2.5 0.5 - 4.6 K/UL    ABS. MONOCYTES 0.7 0.1 - 1.3 K/UL    ABS.  EOSINOPHILS 0.1 0.0 - 0.8 K/UL    ABS. BASOPHILS 0.0 0.0 - 0.2 K/UL    ABS. IMM. GRANS. 0.0 0.0 - 0.5 K/UL   METABOLIC PANEL, COMPREHENSIVE    Collection Time: 04/16/18  1:34 PM   Result Value Ref Range    Sodium 139 136 - 145 mmol/L    Potassium 4.1 3.5 - 5.1 mmol/L    Chloride 108 (H) 98 - 107 mmol/L    CO2 24 21 - 32 mmol/L    Anion gap 7 7 - 16 mmol/L    Glucose 108 (H) 65 - 100 mg/dL    BUN 14 6 - 23 MG/DL    Creatinine 0.63 0.6 - 1.0 MG/DL    GFR est AA >60 >60 ml/min/1.73m2    GFR est non-AA >60 >60 ml/min/1.73m2    Calcium 8.5 8.3 - 10.4 MG/DL    Bilirubin, total 0.2 0.2 - 1.1 MG/DL    ALT (SGPT) 39 12 - 65 U/L    AST (SGOT) 25 15 - 37 U/L    Alk. phosphatase 57 50 - 136 U/L    Protein, total 7.5 6.3 - 8.2 g/dL    Albumin 3.7 3.5 - 5.0 g/dL    Globulin 3.8 (H) 2.3 - 3.5 g/dL    A-G Ratio 1.0 (L) 1.2 - 3.5     TROPONIN I    Collection Time: 04/16/18  1:34 PM   Result Value Ref Range    Troponin-I, Qt. <0.02 (L) 0.02 - 0.05 NG/ML   LIPASE    Collection Time: 04/16/18  1:34 PM   Result Value Ref Range    Lipase 180 73 - 393 U/L   D DIMER    Collection Time: 04/16/18  1:34 PM   Result Value Ref Range    D DIMER 0.33 <0.56 ug/ml(FEU)   POC TROPONIN-I    Collection Time: 04/16/18  3:28 PM   Result Value Ref Range    Troponin-I (POC) 0 (L) 0.02 - 0.05 ng/ml   URINE MICROSCOPIC    Collection Time: 04/16/18  3:34 PM   Result Value Ref Range    WBC 5-10 0 /hpf    RBC 5-10 0 /hpf    Epithelial cells 5-10 0 /hpf    Bacteria 1+ (H) 0 /hpf    Casts 3-5 0 /lpf   POC TROPONIN-I    Collection Time: 04/16/18  6:05 PM   Result Value Ref Range    Troponin-I (POC) 0 (L) 0.02 - 0.05 ng/ml     Xr Chest Pa Lat    Result Date: 4/16/2018  PA and Lateral Chest X-Ray 4/16/2018 History:  Chest pain Comparison:  None Findings:  Frontal and lateral views submitted. Lungs are adequately expanded and clear. Heart size and cardiomediastinal shadow are within normal limits. Costophrenic angles are sharply marginated. Bony thorax grossly intact.   Pectus excavatum deformity incidentally noted. Impression:  Negative Chest X-ray       37 yo female with chest pain       Delta troponin negative x3 at 4 hours. D-dimer WNL, patient to follow up with cardiology for further workup or return with any return or worsening pain or any further concerns. Patient very well appearing, no pain in ED at this time, smiling, NAD. HEART SCORE 1 and feel appropriate for discharge and follow up with cardiology for further management.

## 2018-04-16 NOTE — ED TRIAGE NOTES
Pt arrived via ems for chest pain. Pt was downtown eating and had substernal chest pain. Pt went to [facility]. Pt was given 324 mg asa by staff.  Pt was pain free upon ems arrival.

## 2018-04-16 NOTE — DISCHARGE INSTRUCTIONS
Chest Pain: Care Instructions  Your Care Instructions    There are many things that can cause chest pain. Some are not serious and will get better on their own in a few days. But some kinds of chest pain need more testing and treatment. Your doctor may have recommended a follow-up visit in the next 8 to 12 hours. If you are not getting better, you may need more tests or treatment. Even though your doctor has released you, you still need to watch for any problems. The doctor carefully checked you, but sometimes problems can develop later. If you have new symptoms or if your symptoms do not get better, get medical care right away. If you have worse or different chest pain or pressure that lasts more than 5 minutes or you passed out (lost consciousness), call 911 or seek other emergency help right away. A medical visit is only one step in your treatment. Even if you feel better, you still need to do what your doctor recommends, such as going to all suggested follow-up appointments and taking medicines exactly as directed. This will help you recover and help prevent future problems. How can you care for yourself at home? · Rest until you feel better. · Take your medicine exactly as prescribed. Call your doctor if you think you are having a problem with your medicine. · Do not drive after taking a prescription pain medicine. When should you call for help? Call 911 if:  ? · You passed out (lost consciousness). ? · You have severe difficulty breathing. ? · You have symptoms of a heart attack. These may include:  ¨ Chest pain or pressure, or a strange feeling in your chest.  ¨ Sweating. ¨ Shortness of breath. ¨ Nausea or vomiting. ¨ Pain, pressure, or a strange feeling in your back, neck, jaw, or upper belly or in one or both shoulders or arms. ¨ Lightheadedness or sudden weakness. ¨ A fast or irregular heartbeat.   After you call 911, the  may tell you to chew 1 adult-strength or 2 to 4 low-dose aspirin. Wait for an ambulance. Do not try to drive yourself. ?Call your doctor today if:  ? · You have any trouble breathing. ? · Your chest pain gets worse. ? · You are dizzy or lightheaded, or you feel like you may faint. ? · You are not getting better as expected. ? · You are having new or different chest pain. Where can you learn more? Go to http://andreina-jason.info/. Enter A120 in the search box to learn more about \"Chest Pain: Care Instructions. \"  Current as of: March 20, 2017  Content Version: 11.4  © 7962-2448 [employer]. Care instructions adapted under license by [employer] (which disclaims liability or warranty for this information). If you have questions about a medical condition or this instruction, always ask your healthcare professional. [address] any warranty or liability for your use of this information. Nitrofurantoin Combination (Macrobid) - (By mouth)   Why this medicine is used:   Treats urinary tract infections caused by bacteria. Contact a nurse or doctor right away if you have:  · Shortness of breath, chest pain, cough, fever, chills, weakness  · Severe or bloody diarrhea  · Yellow skin or eyes  · Dark urine or pale stools, loss of appetite, stomach pain  · Numbness, tingling, or burning pain in your hands, arms, legs, or feet     Common side effects:  · Dizziness, headache, blurred vision  · Nausea, vomiting, brown or yellow urine discoloration  · Hair loss  © 2017 [address] is for End User's use only and may not be sold, redistributed or otherwise used for commercial purposes. Urinary Tract Infection in Women: Care Instructions  Your Care Instructions    A urinary tract infection, or UTI, is a general term for an infection anywhere between the kidneys and the urethra (where urine comes out). Most UTIs are bladder infections.  They often cause pain or burning when you urinate. UTIs are caused by bacteria and can be cured with antibiotics. Be sure to complete your treatment so that the infection goes away. Follow-up care is a key part of your treatment and safety. Be sure to make and go to all appointments, and call your doctor if you are having problems. It's also a good idea to know your test results and keep a list of the medicines you take. How can you care for yourself at home? · Take your antibiotics as directed. Do not stop taking them just because you feel better. You need to take the full course of antibiotics. · Drink extra water and other fluids for the next day or two. This may help wash out the bacteria that are causing the infection. (If you have kidney, heart, or liver disease and have to limit fluids, talk with your doctor before you increase your fluid intake.)  · Avoid drinks that are carbonated or have caffeine. They can irritate the bladder. · Urinate often. Try to empty your bladder each time. · To relieve pain, take a hot bath or lay a heating pad set on low over your lower belly or genital area. Never go to sleep with a heating pad in place. To prevent UTIs  · Drink plenty of water each day. This helps you urinate often, which clears bacteria from your system. (If you have kidney, heart, or liver disease and have to limit fluids, talk with your doctor before you increase your fluid intake.)  · Urinate when you need to. · Urinate right after you have sex. · Change sanitary pads often. · Avoid douches, bubble baths, feminine hygiene sprays, and other feminine hygiene products that have deodorants. · After going to the bathroom, wipe from front to back. When should you call for help? Call your doctor now or seek immediate medical care if:  ? · Symptoms such as fever, chills, nausea, or vomiting get worse or appear for the first time. ? · You have new pain in your back just below your rib cage. This is called flank pain.    ? · There is new blood or pus in your urine. ? · You have any problems with your antibiotic medicine. ? Watch closely for changes in your health, and be sure to contact your doctor if:  ? · You are not getting better after taking an antibiotic for 2 days. ? · Your symptoms go away but then come back. Where can you learn more? Go to http://andreina-jason.info/. Enter V020 in the search box to learn more about \"Urinary Tract Infection in Women: Care Instructions. \"  Current as of: May 12, 2017  Content Version: 11.4  © 0029-6200 [employer]. Care instructions adapted under license by [employer] (which disclaims liability or warranty for this information). If you have questions about a medical condition or this instruction, always ask your healthcare professional. [address] any warranty or liability for your use of this information.

## 2018-04-16 NOTE — ED NOTES
Pt to er c/o having chest pain for 30 min while eating lunch. .. sts she was sob. .. sts she was diaphoretic. .. sts she was sob. .. sts no pain now

## 2018-07-11 ENCOUNTER — ANESTHESIA EVENT (OUTPATIENT)
Dept: SURGERY | Age: 43
End: 2018-07-11
Payer: COMMERCIAL

## 2018-07-12 ENCOUNTER — ANESTHESIA (OUTPATIENT)
Dept: SURGERY | Age: 43
End: 2018-07-12
Payer: COMMERCIAL

## 2018-07-12 ENCOUNTER — HOSPITAL ENCOUNTER (OUTPATIENT)
Age: 43
Setting detail: OUTPATIENT SURGERY
Discharge: HOME | End: 2018-07-12
Attending: SURGERY | Admitting: SURGERY
Payer: COMMERCIAL

## 2018-07-12 VITALS
SYSTOLIC BLOOD PRESSURE: 100 MMHG | DIASTOLIC BLOOD PRESSURE: 62 MMHG | HEART RATE: 67 BPM | WEIGHT: 158.44 LBS | BODY MASS INDEX: 27.05 KG/M2 | TEMPERATURE: 98.7 F | RESPIRATION RATE: 18 BRPM | HEIGHT: 64 IN | OXYGEN SATURATION: 98 %

## 2018-07-12 LAB — HGB BLD-MCNC: 13.5 G/DL (ref 11.7–15.4)

## 2018-07-12 PROCEDURE — 76010000160 HC OR TIME 0.5 TO 1 HR INTENSV-TIER 1: Performed by: SURGERY

## 2018-07-12 PROCEDURE — 77030008522 HC TBNG INSUF LAPRO STRY -B: Performed by: SURGERY

## 2018-07-12 PROCEDURE — 77030035051: Performed by: SURGERY

## 2018-07-12 PROCEDURE — 77030008756 HC TU IRR SUC STRY -B: Performed by: SURGERY

## 2018-07-12 PROCEDURE — 74011250636 HC RX REV CODE- 250/636

## 2018-07-12 PROCEDURE — 77030010513 HC APPL CLP LIG J&J -C: Performed by: SURGERY

## 2018-07-12 PROCEDURE — 74011250636 HC RX REV CODE- 250/636: Performed by: SURGERY

## 2018-07-12 PROCEDURE — 77030039266 HC ADH SKN EXOFIN S2SG -A: Performed by: SURGERY

## 2018-07-12 PROCEDURE — 77030034154 HC SHR COAG HARM ACE J&J -F: Performed by: SURGERY

## 2018-07-12 PROCEDURE — 77030011640 HC PAD GRND REM COVD -A: Performed by: SURGERY

## 2018-07-12 PROCEDURE — 88304 TISSUE EXAM BY PATHOLOGIST: CPT | Performed by: SURGERY

## 2018-07-12 PROCEDURE — 77030002967 HC SUT PDS J&J -B: Performed by: SURGERY

## 2018-07-12 PROCEDURE — 76210000020 HC REC RM PH II FIRST 0.5 HR: Performed by: SURGERY

## 2018-07-12 PROCEDURE — 74011250636 HC RX REV CODE- 250/636: Performed by: ANESTHESIOLOGY

## 2018-07-12 PROCEDURE — 76210000006 HC OR PH I REC 0.5 TO 1 HR: Performed by: SURGERY

## 2018-07-12 PROCEDURE — 77030035220 HC TRCR ENDOSC BLNTPRT ANCHR COVD -B: Performed by: SURGERY

## 2018-07-12 PROCEDURE — 77030018836 HC SOL IRR NACL ICUM -A: Performed by: SURGERY

## 2018-07-12 PROCEDURE — 74011000250 HC RX REV CODE- 250: Performed by: SURGERY

## 2018-07-12 PROCEDURE — 74011000250 HC RX REV CODE- 250

## 2018-07-12 PROCEDURE — 85018 HEMOGLOBIN: CPT | Performed by: ANESTHESIOLOGY

## 2018-07-12 PROCEDURE — 77030020782 HC GWN BAIR PAWS FLX 3M -B: Performed by: ANESTHESIOLOGY

## 2018-07-12 PROCEDURE — 77030035048 HC TRCR ENDOSC OPTCL COVD -B: Performed by: SURGERY

## 2018-07-12 PROCEDURE — 77030008477 HC STYL SATN SLP COVD -A: Performed by: ANESTHESIOLOGY

## 2018-07-12 PROCEDURE — 77030036733 HC ENDOLP LIG VCRL SUT J&J -C: Performed by: SURGERY

## 2018-07-12 PROCEDURE — 74011250637 HC RX REV CODE- 250/637: Performed by: ANESTHESIOLOGY

## 2018-07-12 PROCEDURE — 77030008703 HC TU ET UNCUF COVD -A: Performed by: ANESTHESIOLOGY

## 2018-07-12 PROCEDURE — 76060000033 HC ANESTHESIA 1 TO 1.5 HR: Performed by: SURGERY

## 2018-07-12 PROCEDURE — 77030031139 HC SUT VCRL2 J&J -A: Performed by: SURGERY

## 2018-07-12 PROCEDURE — 77030002933 HC SUT MCRYL J&J -A: Performed by: SURGERY

## 2018-07-12 PROCEDURE — 77030032490 HC SLV COMPR SCD KNE COVD -B: Performed by: SURGERY

## 2018-07-12 RX ORDER — MIDAZOLAM HYDROCHLORIDE 1 MG/ML
2 INJECTION, SOLUTION INTRAMUSCULAR; INTRAVENOUS
Status: DISCONTINUED | OUTPATIENT
Start: 2018-07-12 | End: 2018-07-12 | Stop reason: HOSPADM

## 2018-07-12 RX ORDER — NALOXONE HYDROCHLORIDE 0.4 MG/ML
0.04 INJECTION, SOLUTION INTRAMUSCULAR; INTRAVENOUS; SUBCUTANEOUS
Status: DISCONTINUED | OUTPATIENT
Start: 2018-07-12 | End: 2018-07-12 | Stop reason: HOSPADM

## 2018-07-12 RX ORDER — GLYCOPYRROLATE 0.2 MG/ML
INJECTION INTRAMUSCULAR; INTRAVENOUS AS NEEDED
Status: DISCONTINUED | OUTPATIENT
Start: 2018-07-12 | End: 2018-07-12 | Stop reason: HOSPADM

## 2018-07-12 RX ORDER — FENTANYL CITRATE 50 UG/ML
INJECTION, SOLUTION INTRAMUSCULAR; INTRAVENOUS AS NEEDED
Status: DISCONTINUED | OUTPATIENT
Start: 2018-07-12 | End: 2018-07-12 | Stop reason: HOSPADM

## 2018-07-12 RX ORDER — FENTANYL CITRATE 50 UG/ML
100 INJECTION, SOLUTION INTRAMUSCULAR; INTRAVENOUS ONCE
Status: DISCONTINUED | OUTPATIENT
Start: 2018-07-12 | End: 2018-07-12 | Stop reason: HOSPADM

## 2018-07-12 RX ORDER — ROCURONIUM BROMIDE 10 MG/ML
INJECTION, SOLUTION INTRAVENOUS AS NEEDED
Status: DISCONTINUED | OUTPATIENT
Start: 2018-07-12 | End: 2018-07-12 | Stop reason: HOSPADM

## 2018-07-12 RX ORDER — SODIUM CHLORIDE, SODIUM LACTATE, POTASSIUM CHLORIDE, CALCIUM CHLORIDE 600; 310; 30; 20 MG/100ML; MG/100ML; MG/100ML; MG/100ML
100 INJECTION, SOLUTION INTRAVENOUS CONTINUOUS
Status: DISCONTINUED | OUTPATIENT
Start: 2018-07-12 | End: 2018-07-12 | Stop reason: HOSPADM

## 2018-07-12 RX ORDER — DEXAMETHASONE SODIUM PHOSPHATE 100 MG/10ML
INJECTION INTRAMUSCULAR; INTRAVENOUS AS NEEDED
Status: DISCONTINUED | OUTPATIENT
Start: 2018-07-12 | End: 2018-07-12 | Stop reason: HOSPADM

## 2018-07-12 RX ORDER — LIDOCAINE HYDROCHLORIDE 20 MG/ML
INJECTION, SOLUTION EPIDURAL; INFILTRATION; INTRACAUDAL; PERINEURAL AS NEEDED
Status: DISCONTINUED | OUTPATIENT
Start: 2018-07-12 | End: 2018-07-12 | Stop reason: HOSPADM

## 2018-07-12 RX ORDER — OXYCODONE HYDROCHLORIDE 5 MG/1
5 TABLET ORAL
Status: COMPLETED | OUTPATIENT
Start: 2018-07-12 | End: 2018-07-12

## 2018-07-12 RX ORDER — ONDANSETRON 2 MG/ML
INJECTION INTRAMUSCULAR; INTRAVENOUS AS NEEDED
Status: DISCONTINUED | OUTPATIENT
Start: 2018-07-12 | End: 2018-07-12 | Stop reason: HOSPADM

## 2018-07-12 RX ORDER — BUPIVACAINE HYDROCHLORIDE 5 MG/ML
INJECTION, SOLUTION EPIDURAL; INTRACAUDAL AS NEEDED
Status: DISCONTINUED | OUTPATIENT
Start: 2018-07-12 | End: 2018-07-12 | Stop reason: HOSPADM

## 2018-07-12 RX ORDER — LIDOCAINE HYDROCHLORIDE 10 MG/ML
0.1 INJECTION INFILTRATION; PERINEURAL AS NEEDED
Status: DISCONTINUED | OUTPATIENT
Start: 2018-07-12 | End: 2018-07-12 | Stop reason: HOSPADM

## 2018-07-12 RX ORDER — PROPOFOL 10 MG/ML
INJECTION, EMULSION INTRAVENOUS AS NEEDED
Status: DISCONTINUED | OUTPATIENT
Start: 2018-07-12 | End: 2018-07-12 | Stop reason: HOSPADM

## 2018-07-12 RX ORDER — HYDROMORPHONE HYDROCHLORIDE 2 MG/ML
0.5 INJECTION, SOLUTION INTRAMUSCULAR; INTRAVENOUS; SUBCUTANEOUS
Status: DISCONTINUED | OUTPATIENT
Start: 2018-07-12 | End: 2018-07-12 | Stop reason: HOSPADM

## 2018-07-12 RX ORDER — MIDAZOLAM HYDROCHLORIDE 1 MG/ML
2 INJECTION, SOLUTION INTRAMUSCULAR; INTRAVENOUS ONCE
Status: COMPLETED | OUTPATIENT
Start: 2018-07-12 | End: 2018-07-12

## 2018-07-12 RX ORDER — CEFAZOLIN SODIUM/WATER 2 G/20 ML
2 SYRINGE (ML) INTRAVENOUS ONCE
Status: COMPLETED | OUTPATIENT
Start: 2018-07-12 | End: 2018-07-12

## 2018-07-12 RX ORDER — HYDROMORPHONE HYDROCHLORIDE 2 MG/ML
INJECTION, SOLUTION INTRAMUSCULAR; INTRAVENOUS; SUBCUTANEOUS AS NEEDED
Status: DISCONTINUED | OUTPATIENT
Start: 2018-07-12 | End: 2018-07-12 | Stop reason: HOSPADM

## 2018-07-12 RX ADMIN — FENTANYL CITRATE 100 MCG: 50 INJECTION, SOLUTION INTRAMUSCULAR; INTRAVENOUS at 09:03

## 2018-07-12 RX ADMIN — ONDANSETRON 4 MG: 2 INJECTION INTRAMUSCULAR; INTRAVENOUS at 09:33

## 2018-07-12 RX ADMIN — HYDROMORPHONE HYDROCHLORIDE 0.5 MG: 2 INJECTION, SOLUTION INTRAMUSCULAR; INTRAVENOUS; SUBCUTANEOUS at 09:44

## 2018-07-12 RX ADMIN — HYDROMORPHONE HYDROCHLORIDE 0.5 MG: 2 INJECTION, SOLUTION INTRAMUSCULAR; INTRAVENOUS; SUBCUTANEOUS at 09:41

## 2018-07-12 RX ADMIN — PROPOFOL 200 MG: 10 INJECTION, EMULSION INTRAVENOUS at 09:03

## 2018-07-12 RX ADMIN — DEXAMETHASONE SODIUM PHOSPHATE 10 MG: 100 INJECTION INTRAMUSCULAR; INTRAVENOUS at 09:11

## 2018-07-12 RX ADMIN — GLYCOPYRROLATE 0.2 MG: 0.2 INJECTION INTRAMUSCULAR; INTRAVENOUS at 09:26

## 2018-07-12 RX ADMIN — HYDROMORPHONE HYDROCHLORIDE 0.5 MG: 2 INJECTION, SOLUTION INTRAMUSCULAR; INTRAVENOUS; SUBCUTANEOUS at 09:22

## 2018-07-12 RX ADMIN — LIDOCAINE HYDROCHLORIDE 60 MG: 20 INJECTION, SOLUTION EPIDURAL; INFILTRATION; INTRACAUDAL; PERINEURAL at 09:03

## 2018-07-12 RX ADMIN — ROCURONIUM BROMIDE 50 MG: 10 INJECTION, SOLUTION INTRAVENOUS at 09:03

## 2018-07-12 RX ADMIN — SODIUM CHLORIDE, SODIUM LACTATE, POTASSIUM CHLORIDE, AND CALCIUM CHLORIDE 100 ML/HR: 600; 310; 30; 20 INJECTION, SOLUTION INTRAVENOUS at 08:04

## 2018-07-12 RX ADMIN — MIDAZOLAM HYDROCHLORIDE 2 MG: 1 INJECTION, SOLUTION INTRAMUSCULAR; INTRAVENOUS at 08:55

## 2018-07-12 RX ADMIN — Medication 2 G: at 08:57

## 2018-07-12 RX ADMIN — OXYCODONE HYDROCHLORIDE 5 MG: 5 TABLET ORAL at 10:17

## 2018-07-12 RX ADMIN — HYDROMORPHONE HYDROCHLORIDE 0.5 MG: 2 INJECTION, SOLUTION INTRAMUSCULAR; INTRAVENOUS; SUBCUTANEOUS at 09:51

## 2018-07-12 NOTE — IP AVS SNAPSHOT
[address] [address] 
[phone] Patient: Sophia Padilla MRN: [id number] :1975 About your hospitalization You were admitted on:  2018 You last received care in the:  [facility] PAC[facility] You were discharged on:  2018 Why you were hospitalized Your primary diagnosis was:  Not on File Follow-up Information Follow up With Details Comments Contact Info All Bailey MD   [address] [place] [id number] 
[phone] Your Scheduled Appointments 2018  9:00 AM EDT Global Post Op with AMARIS Lerma  
[facility] ([address] [place] [address]) [address] [place] [id number] [phone] Discharge Orders None A check aslhey indicates which time of day the medication should be taken. My Medications CONTINUE taking these medications Instructions Each Dose to Equal  
 Morning Noon Evening Bedtime  
 dicyclomine 10 mg capsule Commonly known as:  BENTYL Your last dose was: Your next dose is:    
   
   
 1 to 2 q6h prn abd cramping Discharge Instructions 1. Diet as tolerated except for a  low fat diet after laparoscopic cholecystectomy. 2. Showering is allowed, but no tub baths, hot tubs or swimming. 3. Drainage is common from the wounds. Change the dressings as needed. Call our office if the wounds become reddened, tender, feel warm to the touch or pus starts to drain from the wound. 4. Take prescribed pain medication as directed, usually Percocet, Norco, Ultram or Dilaudid. Take over the counter medication for minor pain. 5. Ice may be applied intermittently to the surgical site or sites. 6. Call or office, [phone], if problems arise. 7. Follow up in the office at the assigned time. 8. Resume all medications as taken per surgery, unless specifically instructed not to take certain ones. 9. No lifting more than 25 pounds until told otherwise. 10. Driving is allowed 3 days after surgery as long as you feel comfortable enough to drive and have not taken any prescription pain medication prior to driving. After general anesthesia or intravenous sedation, for 24 hours or while taking prescription Narcotics: · Limit your activities · Do not drive and operate hazardous machinery · Do not make important personal or business decisions · Do  not drink alcoholic beverages · If you have not urinated within 8 hours after discharge, please contact your surgeon on call. *  Please give a list of your current medications to your Primary Care Provider. *  Please update this list whenever your medications are discontinued, doses are 
    changed, or new medications (including over-the-counter products) are added. *  Please carry medication information at all times in case of emergency situations. These are general instructions for a healthy lifestyle: No smoking/ No tobacco products/ Avoid exposure to second hand smoke Surgeon General's Warning:  Quitting smoking now greatly reduces serious risk to your health. Obesity, smoking, and sedentary lifestyle greatly increases your risk for illness A healthy diet, regular physical exercise & weight monitoring are important for maintaining a healthy lifestyle Recognize signs and symptoms of STROKE: 
F-face looks uneven A-arms unable to move or move unevenly S-speech slurred or non-existent T-time-call 911 as soon as signs and symptoms begin-DO NOT go Back to bed or wait to see if you get better-TIME IS BRAIN. Introducing [facility]! Dear Harvinder Sharp: 
Thank you for requesting a [employer] account.   Our records indicate that you already have an active Mashape account. You can access your account anytime at [email or web address] Did you know that you can access your hospital and ER discharge instructions at any time in Mashape? You can also review all of your test results from your hospital stay or ER visit. Additional Information If you have questions, please visit the Frequently Asked Questions section of the Mashape website at [email or web address] Remember, Mashape is NOT to be used for urgent needs. For medical emergencies, dial 911. Now available from your iPhone and Android! Introducing Juan Pablo Villalba As a [facility] [facility] patient, I wanted to make you aware of our electronic visit tool called Juan Pablo Villalba. [employer] allows you to connect within minutes with a medical provider 24 hours a day, seven days a week via a mobile device or tablet or logging into a secure website from your computer. You can access Juan Pablo Villalba from anywhere in the [place]. A virtual visit might be right for you when you have a simple condition and feel like you just dont want to get out of bed, or cant get away from work for an appointment, when your regular [employer] provider is not available (evenings, weekends or holidays), or when youre out of town and need minor care. Electronic visits cost only $49 and if the [employer] provider determines a prescription is needed to treat your condition, one can be electronically transmitted to a nearby pharmacy*. Please take a moment to enroll today if you have not already done so. The enrollment process is free and takes just a few minutes. To enroll, please download the "[employer] belle to your tablet or phone, or visit [email or web address] to enroll on your computer.    
And, as an [address] [place] [address] patient with a [employer] account, the results of your visits will be scanned into your electronic medical record and your primary care provider will be able to view the scanned results. We urge you to continue to see your regular [employer] provider for your ongoing medical care. And while your primary care provider may not be the one available when you seek a Juan Pablo Hensonirmafin virtual visit, the peace of mind you get from getting a real diagnosis real time can be priceless. For more information on Juan Pablo Hensonirmafin, view our Frequently Asked Questions (FAQs) at [email or web address]. Sincerely, 
 
Mai Penaloza MD 
Chief Medical Officer [address] *:  certain medications cannot be prescribed via Juan Pablo Hensonchadwick Providers Seen During Your Hospitalization Provider Specialty Primary office phone Migel Alexia, [address] [place] [address] [phone] Your Primary Care Physician (PCP) Primary Care Physician Office Phone Office Fax Paola Burroughs [phone] You are allergic to the following No active allergies Recent Documentation Height Weight BMI OB Status Smoking Status 1.626 m 71.9 kg 27.2 kg/m2 Ablation Never Smoker Emergency Contacts Name Discharge Info Relation Home Work Mobile Adrianne Saldivar [5] [phone] Jeana Gonzalez  Spouse [3] [phone] Patient Belongings The following personal items are in your possession at time of discharge: 
  Dental Appliances: None Please provide this summary of care documentation to your next provider. Signatures-by signing, you are acknowledging that this After Visit Summary has been reviewed with you and you have received a copy. Patient Signature:  ____________________________________________________________  Date:  ____________________________________________________________  
  
Marleny Miranda    
    
 Provider Signature:  ____________________________________________________________ Date:  ____________________________________________________________

## 2018-07-12 NOTE — H&P (VIEW-ONLY)
aDate: 2018      Name: Gautam Woods      MRN: [id number]       : 1975       Age: 38 y.o. Sex: female        Kuldeep Bangura MD       CC:    Chief Complaint   Patient presents with    New Patient     cholelithiasis       HPI:     Gautam Woods is a 38 y.o. female who presents for evaluation of gallbladder problems as a referral from Dr. Melchor Ybarra. The patient had an [address]  done on 18 which showed multiple gallstones and gallbladder sludge. The patient has had two attacks of RUQ pain, nausea, pain referred to her back, bloating and GERD over the past month. The last attack lasted 1.5 hours. No report of vomiting or diarrhea. PMH:    Past Medical History:   Diagnosis Date    Acquired hypothyroidism     history of hypothyroidism, resolved after getting pregnancy    Heavy periods     History of ectopic pregnancy     methotrexate    Infertility     famera, clomid, injections, IUI       PSH:    Past Surgical History:   Procedure Laterality Date    HX APPENDECTOMY  2013    laparoscopic    HX  SECTION  04/19/2011    x2    HX DILATION AND CURETTAGE      HX GYN      [facility]ure,  for fallopian tube birth control    HX HYSTEROSCOPY WITH ENDOMETRIAL ABLATION  2017    for menorrhagia    HX OTHER SURGICAL  2009    nasal surgery    HX REFRACTIVE SURGERY Bilateral        MEDS:    Current Outpatient Prescriptions   Medication Sig    dicyclomine (BENTYL) 10 mg capsule 1 to 2 q6h prn abd cramping     No current facility-administered medications for this visit.         ALLERGIES:      No Known Allergies    SH:    Social History   Substance Use Topics    Smoking status: Never Smoker    Smokeless tobacco: Never Used    Alcohol use Yes      Comment: socially       FH:    Family History   Problem Relation Age of Onset    Diabetes Father     Cancer Paternal Aunt      breast    No Known Problems Mother     Breast Cancer Neg Hx        ROS: The patient has no difficulty with chest pain or shortness of breath. No fever or chills. Comprehensive 13 point review of systems was otherwise unremarkable except as noted above. Physical Exam:     Visit Vitals    /65    Pulse 71    Ht 5' 4\" (1.626 m)    Wt 161 lb (73 kg)    BMI 27.64 kg/m2       General: Alert, oriented, cooperative female in no acute distress. Eyes: Sclera are clear. Conjunctiva and lids within normal limits. No icterus. Ears and Nose: no gross deformities to visual inspection, gross hearing intact  Neck: Supple, trachea midline, no appreciable thyromegaly  Resp: Breathing is  non-labored. Lungs clear to auscultation without wheezing or rhonchi   CV: RRR. No murmurs, rubs or gallops appreciated. Abd: soft, mild RUQ tenderness, active BS'S. No rebound or guarding. Psych:  Mood and affect appropriate. Short-term memory and understanding intact      Assessment/Plan:  Slime Zuniga is a 38 y.o. female who has signs and symptoms consistent with cholelithiasis/cholecystitis. 1. Laparoscopic, possible open, cholecystectomy. I went through the risks of bleeding, infection and anesthesia. I went through other risks of injury to the liver, biliary tree structures, stomach, small bowel, large bowel , pancreas and the potential need to convert to an open procedure.     Eder Hyman MD      [facility]   6/28/2018  3:59 PM

## 2018-07-12 NOTE — OP NOTES
Cholecystectomy Op Note Template Note     Indications: The patient was admitted to the hospital with cholelithiasis/cholecystitis. The patient now presents for laparoscopic, possible open, cholecystectomy after discussing therapeutic alternatives. Pre-Op Diagnosis: Calculus of gallbladder and bile duct with acute and chronic cholecystitis without obstruction [K80.66]    Post-Op Diagnosis:  Calculus of gallbladder and bile duct with acute and chronic cholecystitis without obstruction [K80.66]    Procedure: LAPAROSCOPIC CHOLECYSTECTOMY      Surgeon: Jocelyn Garcia MD    Anesthesia:  General plus local         Procedure Details     The patient was brought to the operating room and placed supine. After induction of a general anesthetic, the abdomen was prepped and draped in standard fashion. An incision was made in the umbilicus and a Clemencia trocar was placed in the usual fashion after which  the abdomen was insufflated to 15mmHg sterile CO2. The other trocars were then placed under direct vision. These were positioned four fingerbreadths below the right costal margin in the anterior axillary line and mid clavicular line and just to the right of the falciform ligament in the epigatrium. The gallbladder was grasped. Omental adhesions were taken down. The area of Calot's triangle was dissected with the cystic duct and cystic artery being exposed. Once these two structures had been identified the gallbladder graspers were repositioned, so that one was on the liver/gallbladder junction and a second on the fundus of the gallbladder. The gallbladder was  from the liver with the use of the Harmonic scalpel. The dissection was taken down to the cystic artery which was divided between four 5 mm clips. The cystic duct was divided between four 5 mm clips. The gallbladder was free of all attachments and was removed from the abdomen via the umbilical incision site.  The gallbladder was sent to pathology for evaluation at this point. The Clemencia was returned to the umbilical incision site, the insufflation was restarted and the camera placed through the Clemencia trocar. The gallbladder fossa was without evidence of bleeding, there was no bleeding from the cystic artery stump and there was no evidence of bile leak from the cystic duct stump. The three upper abdominal trocars were removed under the direct vision without bleeding from the trocar sites. The Clemencia trocar was removed and the fascia of the umbilicus was closed with 0'0  Vicryl. The trocar sites were closed with the subcuticular Monocryl and Steri-strips. A sterile dressing was applied. The patient was taken to the recovery room in good condition, having tolerated the procedure well. Instrument, sponge, and needle counts were correct prior to abdominal closure and at the conclusion of the case. Findings: Gallstones.     Estimated Blood Loss:    5 cc's          Specimens: Gallbladder       Signed: Lucien Rodríguez MD

## 2018-07-12 NOTE — ANESTHESIA PREPROCEDURE EVALUATION
Anesthetic History   No history of anesthetic complications            Review of Systems / Medical History  Pertinent labs reviewed    Pulmonary  Within defined limits                 Neuro/Psych   Within defined limits           Cardiovascular  Within defined limits                Exercise tolerance: >4 METS     GI/Hepatic/Renal  Within defined limits              Endo/Other             Other Findings            Physical Exam    Airway  Mallampati: II  TM Distance: 4 - 6 cm  Neck ROM: normal range of motion   Mouth opening: Normal     Cardiovascular  Regular rate and rhythm,  S1 and S2 normal,  no murmur, click, rub, or gallop             Dental  No notable dental hx       Pulmonary  Breath sounds clear to auscultation               Abdominal  GI exam deferred       Other Findings            Anesthetic Plan    ASA: 1  Anesthesia type: general          Induction: Intravenous  Anesthetic plan and risks discussed with: Patient

## 2018-07-12 NOTE — PERIOP NOTES
Sugamadex warning appeared after patient discharged from hospital and CRNA report did not include it's use. Sudhakar Arnett her friend that was here for discharge was called and informed about the use of this drug and it possible birth control interference. Sudhakar Arnett stated that Mrs. Jet White did not use hormone based birth control and thank you for the call. Mrs Jet White continues to do very well after discharge.

## 2018-07-12 NOTE — DISCHARGE INSTRUCTIONS
1. Diet as tolerated except for a  low fat diet after laparoscopic cholecystectomy. 2. Showering is allowed, but no tub baths, hot tubs or swimming. 3. Drainage is common from the wounds. Change the dressings as needed. Call our office if the wounds become reddened, tender, feel warm to the touch or pus starts to drain from the wound. 4. Take prescribed pain medication as directed, usually Percocet, Norco, Ultram or Dilaudid. Take over the counter medication for minor pain. 5. Ice may be applied intermittently to the surgical site or sites. 6. Call or office, [phone], if problems arise. 7. Follow up in the office at the assigned time. 8. Resume all medications as taken per surgery, unless specifically instructed not to take certain ones. 9. No lifting more than 25 pounds until told otherwise. 10. Driving is allowed 3 days after surgery as long as you feel comfortable enough to drive and have not taken any prescription pain medication prior to driving. After general anesthesia or intravenous sedation, for 24 hours or while taking prescription Narcotics:  · Limit your activities  · Do not drive and operate hazardous machinery  · Do not make important personal or business decisions  · Do  not drink alcoholic beverages  · If you have not urinated within 8 hours after discharge, please contact your surgeon on call. *  Please give a list of your current medications to your Primary Care Provider. *  Please update this list whenever your medications are discontinued, doses are      changed, or new medications (including over-the-counter products) are added. *  Please carry medication information at all times in case of emergency situations. These are general instructions for a healthy lifestyle:  No smoking/ No tobacco products/ Avoid exposure to second hand smoke  Surgeon General's Warning:  Quitting smoking now greatly reduces serious risk to your health.   Obesity, smoking, and sedentary lifestyle greatly increases your risk for illness  A healthy diet, regular physical exercise & weight monitoring are important for maintaining a healthy lifestyle    Recognize signs and symptoms of STROKE:  F-face looks uneven  A-arms unable to move or move unevenly  S-speech slurred or non-existent  T-time-call 911 as soon as signs and symptoms begin-DO NOT go       Back to bed or wait to see if you get better-TIME IS BRAIN.

## 2018-07-12 NOTE — INTERVAL H&P NOTE
H&P Update:  Byron Sims was seen and examined. History and physical has been reviewed. The patient has been examined.  There have been no significant clinical changes since the completion of the originally dated History and Physical.    Signed By: Jorge Luis Romero MD     July 12, 2018 8:04 AM

## 2018-07-12 NOTE — ANESTHESIA POSTPROCEDURE EVALUATION
Post-Anesthesia Evaluation and Assessment    Patient: Casandra Valenzuela MRN: [id number]  SSN: [id number]    YOB: 1975  Age: 38 y.o. Sex: female       Cardiovascular Function/Vital Signs  Visit Vitals    /62    Pulse 67    Temp 37.1 °C (98.7 °F)    Resp 18    Ht 5' 4\" (1.626 m)    Wt 71.9 kg (158 lb 7 oz)    SpO2 98%    BMI 27.2 kg/m2       Patient is status post general anesthesia for Procedure(s):  CHOLECYSTECTOMY LAPAROSCOPIC. Nausea/Vomiting: None    Postoperative hydration reviewed and adequate. Pain:  Pain Scale 1: Numeric (0 - 10) (07/12/18 1038)  Pain Intensity 1: 2 (07/12/18 1038)   Managed    Neurological Status:   Neuro (WDL): Within Defined Limits (07/12/18 1038)   At baseline    Mental Status and Level of Consciousness: Awake. Pulmonary Status:   O2 Device: Room air (07/12/18 1038)   Adequate oxygenation and airway patent    Complications related to anesthesia: None    Post-anesthesia assessment completed.  No concerns    Signed By: Mark Almaraz MD     July 12, 2018

## 2018-07-24 PROBLEM — Z90.49 S/P LAPAROSCOPIC CHOLECYSTECTOMY: Status: ACTIVE | Noted: 2018-07-24

## 2019-01-19 ENCOUNTER — HOSPITAL ENCOUNTER (OUTPATIENT)
Dept: MAMMOGRAPHY | Age: 44
Discharge: HOME | End: 2019-01-19
Attending: OBSTETRICS & GYNECOLOGY
Payer: COMMERCIAL

## 2019-01-19 DIAGNOSIS — Z12.31 VISIT FOR SCREENING MAMMOGRAM: ICD-10-CM

## 2019-01-19 PROCEDURE — 77063 BREAST TOMOSYNTHESIS BI: CPT

## 2019-07-17 PROBLEM — N92.0 MENORRHAGIA WITH REGULAR CYCLE: Status: RESOLVED | Noted: 2017-09-25 | Resolved: 2019-07-17

## 2019-09-17 RX ORDER — SODIUM CHLORIDE 0.9 % (FLUSH) 0.9 %
5-40 SYRINGE (ML) INJECTION EVERY 8 HOURS
Status: CANCELLED | OUTPATIENT
Start: 2019-09-17

## 2019-09-17 RX ORDER — SODIUM CHLORIDE 0.9 % (FLUSH) 0.9 %
5-40 SYRINGE (ML) INJECTION AS NEEDED
Status: CANCELLED | OUTPATIENT
Start: 2019-09-17

## 2019-09-17 NOTE — H&P
Gynecology History and Physical    Name: Sravanthi Kruse MRN: [id number] SSN: [id number]    YOB: 1975  Age: 45 y.o. Sex: female       Subjective:      Chief complaint:  Chronic pelvic pain and Kimberly Clark is a 45 y.o.  female with a history of abnormal uterine bleeding, chronic pelvic pain and dysmenorrhea. Previous workup included Ultrasound which revealed findings consistent with post tubal/ablation syndrome. Previous treatment measures included endometrial ablation, hormone therapy and nonsteroidal anti-inflammatory drugs (NSAID's). She is admitted for Procedure(s) (LRB):  ROBOTIC TOTAL LAPAROSCOPIC HYSTERECTOMY/ BILATERAL SALPINGECTOMY (N/A). The current method of family planning is tubal ligation.     OB History        4    Para   2    Term   2            AB   2    Living   2       SAB   1    TAB        Ectopic   1    Molar        Multiple        Live Births   2              Past Medical History:   Diagnosis Date    Acquired hypothyroidism     history of hypothyroidism, resolved after getting pregnancy    Heavy periods     History of ectopic pregnancy 2009    methotrexate    Infertility     famera, clomid, injections, IUI     Past Surgical History:   Procedure Laterality Date    HX APPENDECTOMY  2013    laparoscopic    HX  SECTION  04/19/2011    x2    HX DILATION AND CURETTAGE      HX GYN      Essure,  for fallopian tube birth control    HX HYSTEROSCOPY WITH ENDOMETRIAL ABLATION  2017    for menorrhagia    HX OTHER SURGICAL  2009    nasal surgery    HX REFRACTIVE SURGERY Bilateral      Social History     Occupational History    Not on file   Tobacco Use    Smoking status: Never Smoker    Smokeless tobacco: Never Used   Substance and Sexual Activity    Alcohol use: Yes     Comment: socially    Drug use: No    Sexual activity: Yes     Partners: Male     Birth control/protection: Surgical     Comment: essure      Family History Problem Relation Age of Onset    Diabetes Father     Stroke Father     Heart Disease Father 58        had CABG- ?MI    Cancer Paternal Aunt         breast    Breast Cancer Paternal Aunt     No Known Problems Mother     No Known Problems Sister     No Known Problems Brother         Allergies   Allergen Reactions    Adhesive Rash     Surgical glue     Prior to Admission medications    Medication Sig Start Date End Date Taking? Authorizing Provider   nitrofurantoin, macrocrystal-monohydrate, (MACROBID) 100 mg capsule Take 1 Cap by mouth two (2) times a day. 10/29/18   Leslee Plush, ANP   dicyclomine (BENTYL) 10 mg capsule 1 to 2 q6h prn abd cramping 6/18/18   Judy Johnson MD        Review of Systems:  A comprehensive review of systems was negative except for that written in the History of Present Illness. Objective: There were no vitals filed for this visit. Physical Exam:  Patient without distress. Heart: Regular rate and rhythm  Lung: clear to auscultation throughout lung fields, no wheezes, no rales, no rhonchi and normal respiratory effort    Assessment:     Active Problems:    * No active hospital problems. *     Chronic pelvic pain with post ablation/tubal syndrome    Plan:     Procedure(s) (LRB):  ROBOTIC TOTAL LAPAROSCOPIC HYSTERECTOMY/ BILATERAL SALPINGECTOMY (N/A)  Discussed the risks of surgery including the risks of bleeding, infection, deep vein thrombosis, and surgical injuries to internal organs including but not limited to the bowels, bladder, rectum, and female reproductive organs. The patient understands the risks; any and all questions were answered to the patient's satisfaction.

## 2019-09-19 ENCOUNTER — HOSPITAL ENCOUNTER (OUTPATIENT)
Dept: SURGERY | Age: 44
Discharge: HOME | End: 2019-09-19

## 2019-09-19 VITALS — HEIGHT: 64 IN | BODY MASS INDEX: 28 KG/M2 | WEIGHT: 164 LBS

## 2019-09-19 NOTE — PERIOP NOTES
Patient verified name and . Order for consent found in EHR and matches case posting; patient verifies procedure. Type 2 surgery, phone assessment complete. Orders received. Labs per surgeon: none needed. Labs per anesthesia protocol: HGB=13.9 on 13; result within anesthesia protocols and available in EHR for reference if needed. EKG: not needed per anesthesia protocols. Patient answered medical/surgical history questions at their best of ability. All prior to admission medications documented in [facility]. Patient instructed to take the following medications the day of surgery according to anesthesia guidelines with a small sip of water: none. Hold all vitamins 7 days prior to surgery and NSAIDS 5 days prior to surgery. Prescription meds to hold: none. Patient instructed on the following:  Arrive at A Entrance, time of arrival to be called the day before by 1700  NPO after midnight including gum, mints, and ice chips  Responsible adult must drive patient to the hospital, stay during surgery, and patient will need supervision 24 hours after anesthesia  Use Hibiclens in shower the night before surgery and on the morning of surgery  All piercings must be removed prior to arrival.    Leave all valuables (money and jewelry) at home but bring insurance card and ID on DOS. Do not wear make-up, nail polish, lotions, cologne, perfumes, powders, or oil on skin. Patient teach back successful and patient demonstrates knowledge of instruction.

## 2019-09-25 ENCOUNTER — ANESTHESIA EVENT (OUTPATIENT)
Dept: SURGERY | Age: 44
End: 2019-09-25
Payer: COMMERCIAL

## 2019-09-26 ENCOUNTER — ANESTHESIA (OUTPATIENT)
Dept: SURGERY | Age: 44
End: 2019-09-26
Payer: COMMERCIAL

## 2019-09-26 ENCOUNTER — HOSPITAL ENCOUNTER (OUTPATIENT)
Age: 44
Discharge: HOME | End: 2019-09-27
Attending: OBSTETRICS & GYNECOLOGY | Admitting: OBSTETRICS & GYNECOLOGY
Payer: COMMERCIAL

## 2019-09-26 DIAGNOSIS — Z90.710 STATUS POST HYSTERECTOMY: Primary | ICD-10-CM

## 2019-09-26 LAB
ABO + RH BLD: NORMAL
BLOOD GROUP ANTIBODIES SERPL: NORMAL
HCG UR QL: NEGATIVE
SPECIMEN EXP DATE BLD: NORMAL

## 2019-09-26 PROCEDURE — 88307 TISSUE EXAM BY PATHOLOGIST: CPT

## 2019-09-26 PROCEDURE — 76210000016 HC OR PH I REC 1 TO 1.5 HR: Performed by: OBSTETRICS & GYNECOLOGY

## 2019-09-26 PROCEDURE — 77030034696 HC CATH URETH FOL 2W BARD -A: Performed by: OBSTETRICS & GYNECOLOGY

## 2019-09-26 PROCEDURE — 77030031492 HC PRT ACC BLNT AIRSEAL CNMD -B: Performed by: OBSTETRICS & GYNECOLOGY

## 2019-09-26 PROCEDURE — 74011250636 HC RX REV CODE- 250/636: Performed by: OBSTETRICS & GYNECOLOGY

## 2019-09-26 PROCEDURE — 77030040361 HC SLV COMPR DVT MDII -B: Performed by: OBSTETRICS & GYNECOLOGY

## 2019-09-26 PROCEDURE — 77030022704 HC SUT VLOC COVD -B: Performed by: OBSTETRICS & GYNECOLOGY

## 2019-09-26 PROCEDURE — 81025 URINE PREGNANCY TEST: CPT

## 2019-09-26 PROCEDURE — 74011250637 HC RX REV CODE- 250/637: Performed by: ANESTHESIOLOGY

## 2019-09-26 PROCEDURE — 77030003578 HC NDL INSUF VERES AMR -B: Performed by: OBSTETRICS & GYNECOLOGY

## 2019-09-26 PROCEDURE — 77030016151 HC PROTCTR LNS DFOG COVD -B: Performed by: OBSTETRICS & GYNECOLOGY

## 2019-09-26 PROCEDURE — 77030037088 HC TUBE ENDOTRACH ORAL NSL COVD-A: Performed by: ANESTHESIOLOGY

## 2019-09-26 PROCEDURE — 77030035277 HC OBTRTR BLDELSS DISP INTU -B: Performed by: OBSTETRICS & GYNECOLOGY

## 2019-09-26 PROCEDURE — 77030039425 HC BLD LARYNG TRULITE DISP TELE -A: Performed by: ANESTHESIOLOGY

## 2019-09-26 PROCEDURE — 76060000035 HC ANESTHESIA 2 TO 2.5 HR: Performed by: OBSTETRICS & GYNECOLOGY

## 2019-09-26 PROCEDURE — 76010000876 HC OR TIME 2 TO 2.5HR INTENSV - TIER 2: Performed by: OBSTETRICS & GYNECOLOGY

## 2019-09-26 PROCEDURE — 77030019940 HC BLNKT HYPOTHRM STRY -B: Performed by: ANESTHESIOLOGY

## 2019-09-26 PROCEDURE — 77030018846 HC SOL IRR STRL H20 ICUM -A: Performed by: OBSTETRICS & GYNECOLOGY

## 2019-09-26 PROCEDURE — 74011000250 HC RX REV CODE- 250: Performed by: ANESTHESIOLOGY

## 2019-09-26 PROCEDURE — 74011000250 HC RX REV CODE- 250

## 2019-09-26 PROCEDURE — 77030018836 HC SOL IRR NACL ICUM -A: Performed by: OBSTETRICS & GYNECOLOGY

## 2019-09-26 PROCEDURE — 77030018778 HC MANIP UTER VCAR CNMD -B: Performed by: OBSTETRICS & GYNECOLOGY

## 2019-09-26 PROCEDURE — 74011250636 HC RX REV CODE- 250/636

## 2019-09-26 PROCEDURE — 86900 BLOOD TYPING SEROLOGIC ABO: CPT

## 2019-09-26 PROCEDURE — 77030020703 HC SEAL CANN DISP INTU -B: Performed by: OBSTETRICS & GYNECOLOGY

## 2019-09-26 PROCEDURE — 74011250636 HC RX REV CODE- 250/636: Performed by: ANESTHESIOLOGY

## 2019-09-26 PROCEDURE — 77030035279 HC SEAL VSL ENDOWR XI INTU -I2: Performed by: OBSTETRICS & GYNECOLOGY

## 2019-09-26 PROCEDURE — 77030039266 HC ADH SKN EXOFIN S2SG -A: Performed by: OBSTETRICS & GYNECOLOGY

## 2019-09-26 PROCEDURE — 77030000038 HC TIP SCIS LAPSCP SURI -B: Performed by: OBSTETRICS & GYNECOLOGY

## 2019-09-26 PROCEDURE — 74011250637 HC RX REV CODE- 250/637: Performed by: OBSTETRICS & GYNECOLOGY

## 2019-09-26 PROCEDURE — 74011000250 HC RX REV CODE- 250: Performed by: OBSTETRICS & GYNECOLOGY

## 2019-09-26 PROCEDURE — 77030020255 HC SOL INJ LR 1000ML BG: Performed by: OBSTETRICS & GYNECOLOGY

## 2019-09-26 PROCEDURE — 77030031139 HC SUT VCRL2 J&J -A: Performed by: OBSTETRICS & GYNECOLOGY

## 2019-09-26 PROCEDURE — 77030010545: Performed by: OBSTETRICS & GYNECOLOGY

## 2019-09-26 PROCEDURE — 77030002933 HC SUT MCRYL J&J -A: Performed by: OBSTETRICS & GYNECOLOGY

## 2019-09-26 PROCEDURE — 77030008756 HC TU IRR SUC STRY -B: Performed by: OBSTETRICS & GYNECOLOGY

## 2019-09-26 RX ORDER — GABAPENTIN 300 MG/1
600 CAPSULE ORAL ONCE
Status: COMPLETED | OUTPATIENT
Start: 2019-09-26 | End: 2019-09-26

## 2019-09-26 RX ORDER — SODIUM CHLORIDE, SODIUM LACTATE, POTASSIUM CHLORIDE, CALCIUM CHLORIDE 600; 310; 30; 20 MG/100ML; MG/100ML; MG/100ML; MG/100ML
75 INJECTION, SOLUTION INTRAVENOUS CONTINUOUS
Status: DISCONTINUED | OUTPATIENT
Start: 2019-09-26 | End: 2019-09-26 | Stop reason: HOSPADM

## 2019-09-26 RX ORDER — SODIUM CHLORIDE 0.9 % (FLUSH) 0.9 %
5-40 SYRINGE (ML) INJECTION EVERY 8 HOURS
Status: DISCONTINUED | OUTPATIENT
Start: 2019-09-26 | End: 2019-09-27 | Stop reason: HOSPADM

## 2019-09-26 RX ORDER — GLYCOPYRROLATE 0.2 MG/ML
INJECTION INTRAMUSCULAR; INTRAVENOUS AS NEEDED
Status: DISCONTINUED | OUTPATIENT
Start: 2019-09-26 | End: 2019-09-26 | Stop reason: HOSPADM

## 2019-09-26 RX ORDER — OXYCODONE HYDROCHLORIDE 5 MG/1
10 TABLET ORAL
Status: DISCONTINUED | OUTPATIENT
Start: 2019-09-26 | End: 2019-09-26 | Stop reason: HOSPADM

## 2019-09-26 RX ORDER — KETOROLAC TROMETHAMINE 30 MG/ML
30 INJECTION, SOLUTION INTRAMUSCULAR; INTRAVENOUS EVERY 6 HOURS
Status: DISCONTINUED | OUTPATIENT
Start: 2019-09-26 | End: 2019-09-27 | Stop reason: HOSPADM

## 2019-09-26 RX ORDER — OXYCODONE HYDROCHLORIDE 5 MG/1
10 TABLET ORAL
Status: DISCONTINUED | OUTPATIENT
Start: 2019-09-26 | End: 2019-09-27 | Stop reason: HOSPADM

## 2019-09-26 RX ORDER — DEXAMETHASONE SODIUM PHOSPHATE 100 MG/10ML
INJECTION INTRAMUSCULAR; INTRAVENOUS AS NEEDED
Status: DISCONTINUED | OUTPATIENT
Start: 2019-09-26 | End: 2019-09-26 | Stop reason: HOSPADM

## 2019-09-26 RX ORDER — DIPHENHYDRAMINE HYDROCHLORIDE 50 MG/ML
12.5 INJECTION, SOLUTION INTRAMUSCULAR; INTRAVENOUS
Status: DISCONTINUED | OUTPATIENT
Start: 2019-09-26 | End: 2019-09-26 | Stop reason: HOSPADM

## 2019-09-26 RX ORDER — CEFAZOLIN SODIUM/WATER 2 G/20 ML
2 SYRINGE (ML) INTRAVENOUS ONCE
Status: COMPLETED | OUTPATIENT
Start: 2019-09-26 | End: 2019-09-26

## 2019-09-26 RX ORDER — SODIUM CHLORIDE 0.9 % (FLUSH) 0.9 %
5-40 SYRINGE (ML) INJECTION AS NEEDED
Status: DISCONTINUED | OUTPATIENT
Start: 2019-09-26 | End: 2019-09-27 | Stop reason: HOSPADM

## 2019-09-26 RX ORDER — OXYCODONE HYDROCHLORIDE 5 MG/1
5 TABLET ORAL
Status: DISCONTINUED | OUTPATIENT
Start: 2019-09-26 | End: 2019-09-26 | Stop reason: HOSPADM

## 2019-09-26 RX ORDER — PROPOFOL 10 MG/ML
INJECTION, EMULSION INTRAVENOUS AS NEEDED
Status: DISCONTINUED | OUTPATIENT
Start: 2019-09-26 | End: 2019-09-26 | Stop reason: HOSPADM

## 2019-09-26 RX ORDER — DOCUSATE SODIUM 100 MG/1
100 CAPSULE, LIQUID FILLED ORAL 2 TIMES DAILY
Status: DISCONTINUED | OUTPATIENT
Start: 2019-09-26 | End: 2019-09-27 | Stop reason: HOSPADM

## 2019-09-26 RX ORDER — SCOLOPAMINE TRANSDERMAL SYSTEM 1 MG/1
1 PATCH, EXTENDED RELEASE TRANSDERMAL ONCE
Status: DISCONTINUED | OUTPATIENT
Start: 2019-09-26 | End: 2019-09-27 | Stop reason: HOSPADM

## 2019-09-26 RX ORDER — OXYCODONE HYDROCHLORIDE 5 MG/1
5 TABLET ORAL
Status: DISCONTINUED | OUTPATIENT
Start: 2019-09-26 | End: 2019-09-27 | Stop reason: HOSPADM

## 2019-09-26 RX ORDER — ONDANSETRON 4 MG/1
4 TABLET, ORALLY DISINTEGRATING ORAL
Status: DISCONTINUED | OUTPATIENT
Start: 2019-09-26 | End: 2019-09-27 | Stop reason: HOSPADM

## 2019-09-26 RX ORDER — EPHEDRINE SULFATE 50 MG/ML
INJECTION, SOLUTION INTRAVENOUS AS NEEDED
Status: DISCONTINUED | OUTPATIENT
Start: 2019-09-26 | End: 2019-09-26 | Stop reason: HOSPADM

## 2019-09-26 RX ORDER — FLUMAZENIL 0.1 MG/ML
0.2 INJECTION INTRAVENOUS AS NEEDED
Status: DISCONTINUED | OUTPATIENT
Start: 2019-09-26 | End: 2019-09-26 | Stop reason: HOSPADM

## 2019-09-26 RX ORDER — ONDANSETRON 2 MG/ML
INJECTION INTRAMUSCULAR; INTRAVENOUS AS NEEDED
Status: DISCONTINUED | OUTPATIENT
Start: 2019-09-26 | End: 2019-09-26 | Stop reason: HOSPADM

## 2019-09-26 RX ORDER — HYDROMORPHONE HYDROCHLORIDE 2 MG/ML
0.5 INJECTION, SOLUTION INTRAMUSCULAR; INTRAVENOUS; SUBCUTANEOUS
Status: DISCONTINUED | OUTPATIENT
Start: 2019-09-26 | End: 2019-09-26 | Stop reason: HOSPADM

## 2019-09-26 RX ORDER — ACETAMINOPHEN 325 MG/1
650 TABLET ORAL
Status: DISCONTINUED | OUTPATIENT
Start: 2019-09-26 | End: 2019-09-27 | Stop reason: HOSPADM

## 2019-09-26 RX ORDER — LIDOCAINE HYDROCHLORIDE 10 MG/ML
0.1 INJECTION INFILTRATION; PERINEURAL AS NEEDED
Status: DISCONTINUED | OUTPATIENT
Start: 2019-09-26 | End: 2019-09-26 | Stop reason: HOSPADM

## 2019-09-26 RX ORDER — MIDAZOLAM HYDROCHLORIDE 1 MG/ML
2 INJECTION, SOLUTION INTRAMUSCULAR; INTRAVENOUS
Status: COMPLETED | OUTPATIENT
Start: 2019-09-26 | End: 2019-09-26

## 2019-09-26 RX ORDER — BUPIVACAINE HYDROCHLORIDE 5 MG/ML
INJECTION, SOLUTION EPIDURAL; INTRACAUDAL AS NEEDED
Status: DISCONTINUED | OUTPATIENT
Start: 2019-09-26 | End: 2019-09-26 | Stop reason: HOSPADM

## 2019-09-26 RX ORDER — LIDOCAINE HYDROCHLORIDE 20 MG/ML
INJECTION, SOLUTION EPIDURAL; INFILTRATION; INTRACAUDAL; PERINEURAL AS NEEDED
Status: DISCONTINUED | OUTPATIENT
Start: 2019-09-26 | End: 2019-09-26 | Stop reason: HOSPADM

## 2019-09-26 RX ORDER — DEXTROSE, SODIUM CHLORIDE, SODIUM LACTATE, POTASSIUM CHLORIDE, AND CALCIUM CHLORIDE 5; .6; .31; .03; .02 G/100ML; G/100ML; G/100ML; G/100ML; G/100ML
75 INJECTION, SOLUTION INTRAVENOUS CONTINUOUS
Status: DISCONTINUED | OUTPATIENT
Start: 2019-09-26 | End: 2019-09-27 | Stop reason: HOSPADM

## 2019-09-26 RX ORDER — NEOSTIGMINE METHYLSULFATE 1 MG/ML
INJECTION INTRAVENOUS AS NEEDED
Status: DISCONTINUED | OUTPATIENT
Start: 2019-09-26 | End: 2019-09-26 | Stop reason: HOSPADM

## 2019-09-26 RX ORDER — KETOROLAC TROMETHAMINE 30 MG/ML
INJECTION, SOLUTION INTRAMUSCULAR; INTRAVENOUS AS NEEDED
Status: DISCONTINUED | OUTPATIENT
Start: 2019-09-26 | End: 2019-09-26 | Stop reason: HOSPADM

## 2019-09-26 RX ORDER — ROCURONIUM BROMIDE 10 MG/ML
INJECTION, SOLUTION INTRAVENOUS AS NEEDED
Status: DISCONTINUED | OUTPATIENT
Start: 2019-09-26 | End: 2019-09-26 | Stop reason: HOSPADM

## 2019-09-26 RX ORDER — FENTANYL CITRATE 50 UG/ML
INJECTION, SOLUTION INTRAMUSCULAR; INTRAVENOUS AS NEEDED
Status: DISCONTINUED | OUTPATIENT
Start: 2019-09-26 | End: 2019-09-26 | Stop reason: HOSPADM

## 2019-09-26 RX ORDER — NALOXONE HYDROCHLORIDE 0.4 MG/ML
0.1 INJECTION, SOLUTION INTRAMUSCULAR; INTRAVENOUS; SUBCUTANEOUS
Status: DISCONTINUED | OUTPATIENT
Start: 2019-09-26 | End: 2019-09-26 | Stop reason: HOSPADM

## 2019-09-26 RX ADMIN — SODIUM CHLORIDE, SODIUM LACTATE, POTASSIUM CHLORIDE, AND CALCIUM CHLORIDE: 600; 310; 30; 20 INJECTION, SOLUTION INTRAVENOUS at 08:46

## 2019-09-26 RX ADMIN — LIDOCAINE HYDROCHLORIDE 60 MG: 20 INJECTION, SOLUTION EPIDURAL; INFILTRATION; INTRACAUDAL; PERINEURAL at 07:50

## 2019-09-26 RX ADMIN — HYDROMORPHONE HYDROCHLORIDE 0.5 MG: 2 INJECTION INTRAMUSCULAR; INTRAVENOUS; SUBCUTANEOUS at 10:15

## 2019-09-26 RX ADMIN — SODIUM CHLORIDE, SODIUM LACTATE, POTASSIUM CHLORIDE, CALCIUM CHLORIDE, AND DEXTROSE MONOHYDRATE 75 ML/HR: 600; 310; 30; 20; 5 INJECTION, SOLUTION INTRAVENOUS at 21:46

## 2019-09-26 RX ADMIN — FENTANYL CITRATE 100 MCG: 50 INJECTION, SOLUTION INTRAMUSCULAR; INTRAVENOUS at 07:50

## 2019-09-26 RX ADMIN — HYDROMORPHONE HYDROCHLORIDE 0.5 MG: 2 INJECTION INTRAMUSCULAR; INTRAVENOUS; SUBCUTANEOUS at 10:25

## 2019-09-26 RX ADMIN — KETOROLAC TROMETHAMINE 30 MG: 30 INJECTION, SOLUTION INTRAMUSCULAR at 21:46

## 2019-09-26 RX ADMIN — NEOSTIGMINE METHYLSULFATE 3 MG: 1 INJECTION INTRAVENOUS at 09:44

## 2019-09-26 RX ADMIN — KETOROLAC TROMETHAMINE 30 MG: 30 INJECTION, SOLUTION INTRAMUSCULAR at 17:28

## 2019-09-26 RX ADMIN — GLYCOPYRROLATE 0.4 MG: 0.2 INJECTION INTRAMUSCULAR; INTRAVENOUS at 09:44

## 2019-09-26 RX ADMIN — SODIUM CHLORIDE, SODIUM LACTATE, POTASSIUM CHLORIDE, AND CALCIUM CHLORIDE 75 ML/HR: 600; 310; 30; 20 INJECTION, SOLUTION INTRAVENOUS at 06:50

## 2019-09-26 RX ADMIN — KETOROLAC TROMETHAMINE 30 MG: 30 INJECTION, SOLUTION INTRAMUSCULAR; INTRAVENOUS at 09:36

## 2019-09-26 RX ADMIN — GABAPENTIN 600 MG: 300 CAPSULE ORAL at 06:35

## 2019-09-26 RX ADMIN — LIDOCAINE HYDROCHLORIDE 0.1 ML: 10 INJECTION, SOLUTION INFILTRATION; PERINEURAL at 06:49

## 2019-09-26 RX ADMIN — DEXAMETHASONE SODIUM PHOSPHATE 10 MG: 100 INJECTION INTRAMUSCULAR; INTRAVENOUS at 08:02

## 2019-09-26 RX ADMIN — SODIUM CHLORIDE, SODIUM LACTATE, POTASSIUM CHLORIDE, CALCIUM CHLORIDE, AND DEXTROSE MONOHYDRATE 75 ML/HR: 600; 310; 30; 20; 5 INJECTION, SOLUTION INTRAVENOUS at 12:00

## 2019-09-26 RX ADMIN — ROCURONIUM BROMIDE 10 MG: 10 INJECTION, SOLUTION INTRAVENOUS at 08:43

## 2019-09-26 RX ADMIN — ROCURONIUM BROMIDE 40 MG: 10 INJECTION, SOLUTION INTRAVENOUS at 07:50

## 2019-09-26 RX ADMIN — MIDAZOLAM 2 MG: 1 INJECTION INTRAMUSCULAR; INTRAVENOUS at 07:40

## 2019-09-26 RX ADMIN — Medication 2 G: at 07:47

## 2019-09-26 RX ADMIN — DOCUSATE SODIUM 100 MG: 100 CAPSULE, LIQUID FILLED ORAL at 17:31

## 2019-09-26 RX ADMIN — PROPOFOL 200 MG: 10 INJECTION, EMULSION INTRAVENOUS at 07:50

## 2019-09-26 RX ADMIN — EPHEDRINE SULFATE 15 MG: 50 INJECTION, SOLUTION INTRAVENOUS at 08:00

## 2019-09-26 RX ADMIN — OXYCODONE HYDROCHLORIDE 10 MG: 5 TABLET ORAL at 12:11

## 2019-09-26 RX ADMIN — ONDANSETRON 4 MG: 2 INJECTION INTRAMUSCULAR; INTRAVENOUS at 09:36

## 2019-09-26 RX ADMIN — DOCUSATE SODIUM 100 MG: 100 CAPSULE, LIQUID FILLED ORAL at 12:11

## 2019-09-26 NOTE — ANESTHESIA PREPROCEDURE EVALUATION
Relevant Problems   No relevant active problems       Anesthetic History   No history of anesthetic complications            Review of Systems / Medical History  Patient summary reviewed and pertinent labs reviewed    Pulmonary  Within defined limits                 Neuro/Psych   Within defined limits           Cardiovascular  Within defined limits                Exercise tolerance: >4 METS     GI/Hepatic/Renal  Within defined limits              Endo/Other  Within defined limits           Other Findings              Physical Exam    Airway  Mallampati: II  TM Distance: 4 - 6 cm  Neck ROM: normal range of motion   Mouth opening: Normal     Cardiovascular    Rhythm: regular  Rate: normal         Dental  No notable dental hx       Pulmonary  Breath sounds clear to auscultation               Abdominal         Other Findings            Anesthetic Plan    ASA: 1  Anesthesia type: general            Anesthetic plan and risks discussed with: Patient

## 2019-09-26 NOTE — OP NOTES
Operative Note    Patient: Moi Butler MRN: [id number]  SSN: [id number]    YOB: 1975  Age: 45 y.o. Sex: female      Date of Surgery: 9/26/2019      Preoperative Diagnosis: Pelvic pain [R10.2]  Bilateral ovarian cysts [N83.201, N83.202]    Postoperative Diagnosis: Pelvic pain [R10.2]  Bilateral ovarian cysts [B38.056, N83.202]    Surgeon(s) and Role:     Arelia Goldmann, MD - Primary        Anesthesia: General    Procedure: Total Robotic Assisted Laparoscopic Hysterectomy with Bilateral Salpingectomy     Findings: normal uterus, left ovarian cyst  and adhesion dense at area of bladder flap.  para tubal cysts L tube    Estimated Blood Loss: less than 50     Drains: none    Pathology/Specimens:     ID Type Source Tests Collected by Time Destination   1 : UTERUS WITH BILATERAL FALLOPAIN TUBES  Fresh Uterus with Bilateral Fallopian Tubes  Nickie Blankenship MD 9/26/2019 1672 Pathology       DVT Prophylaxis: [facility]    Antibiotic Prophylaxis: Ancef    Procedure in Detail:  After an adequate level of anesthesia was obtained, the patient was prepped and draped in the usual sterile fashion in the dorsal lithotomy position. Time out was performed and agreed upon by all present. Joe catheter was placed. Pneumatic compression devices were on a working. A weighted speculum was placed in vagina and the anterior aspect and the cervix was grasped with a tenaculum. The uterus was sounded to a depth of 6 cm and cervix dilated to allow V care manipulator to be placed. The infraumbilical incision was injected with 0.5% marcaine and incision made and the Veress needle inserted. Position was verified with water drop and opening pressure of 4. The peritoneal cavity was insufflated to pressure of 15 with CO2. The non-bladed robotic trocar was placed. Under visualization an 8mm robotic lateral port was placed on the right and a 8mm robotic and 8mm airseal port was placed on the left.   The robot was then attached to the trocars and the instruments were placed under visualization. I broke scrub and went to the console. Monopolar scissors and the Vessel sealer were used as energy sources throughout the case. The right tube was elevated and detached and utero-ovarian ligaments were coagulated and cut. The round ligament was coagulated and cut. The bladder flap was continued to be developed. The broad ligament was coagulated and cut. The bladder flap was developed on the right side this was done sharply due to dense adhesions of bladder flap to area of prior c section hysterotomies. The uterine vessels were then skeletonized and coagulated at the level of the internal os. The same procedure was repeated on the left. The cardinal ligaments were taken down to the vaginal portion of manipulator and this was circumscribed anteriorly and posteriorly. Hemostasis was noted. The uterus and tubes were removed. The vaginal cuff was closed with 2-0 V Loc in 2 layer running stich. The area was irrigated free of blood clots and debris. Hemostasis was noted at all points. The robot was detached. I again scrubbed, performed cystoscopy and bilateral ureteral jets were easily seen. Bladder was noted to be free of defects or foreign bodies. I changed gloved and performed laparoscopy and all areas remained hemostatic as CO2 pressure was lowered. I removed the trocars under visualization. 4-0 monocryl for each of the port sites and then covered with band aids on the skin. All counts were reported as correct. Joe catheter was removed. The patient tolerated the procedure well. She was awakened and went to the recovery room in stable condition.        Signed By: Luz Brown MD     September 26, 2019

## 2019-09-26 NOTE — ANESTHESIA POSTPROCEDURE EVALUATION
Procedure(s):  ROBOTIC TOTAL LAPAROSCOPIC HYSTERECTOMY/ BILATERAL SALPINGECTOMY WITH CYSTOSCOPY.     general    Anesthesia Post Evaluation      Multimodal analgesia: multimodal analgesia used between 6 hours prior to anesthesia start to PACU discharge  Patient location during evaluation: bedside  Patient participation: complete - patient participated  Level of consciousness: responsive to verbal stimuli  Pain management: adequate  Airway patency: patent  Anesthetic complications: no  Cardiovascular status: hemodynamically stable  Respiratory status: spontaneous ventilation  Hydration status: stable        Vitals Value Taken Time   /66 9/26/2019 11:04 AM   Temp 36.6 °C (97.8 °F) 9/26/2019 10:04 AM   Pulse 65 9/26/2019 11:04 AM   Resp 14 9/26/2019 11:04 AM   SpO2 100 % 9/26/2019 11:04 AM

## 2019-09-26 NOTE — PROGRESS NOTES
Gynecology Progress Note    Patient doing well post-op day 0 from Procedure(s):  ROBOTIC TOTAL LAPAROSCOPIC HYSTERECTOMY/ BILATERAL SALPINGECTOMY WITH CYSTOSCOPY without significant complaints. Pain controlled on current medication. Vitals:  Blood pressure 99/59, pulse (!) 109, temperature 99.4 °F (37.4 °C), resp. rate 16, height 5' 4\" (1.626 m), weight 164 lb (74.4 kg), SpO2 90 %, not currently breastfeeding. Temp (24hrs), Av.8 °F (37.1 °C), Min:97.8 °F (36.6 °C), Max:99.4 °F (37.4 °C)        Exam:  Patient without distress. Abdomen soft,  nontender. Lab/Data Review: All lab results for the last 24 hours reviewed. Assessment and Plan:  Patient appears to be having uncomplicated post Procedure(s):  ROBOTIC TOTAL LAPAROSCOPIC HYSTERECTOMY/ BILATERAL SALPINGECTOMY WITH CYSTOSCOPY course. Continue routine post-op care. Discussed findings at surgery and questions answered.

## 2019-09-26 NOTE — PERIOP NOTES
TRANSFER - OUT REPORT:    Verbal report given to [address] RN  on [facility]  being transferred to  for routine post - op       Report consisted of patients Situation, Background, Assessment and   Recommendations(SBAR). Information from the following report(s) SBAR was reviewed with the receiving nurse. Opportunity for questions and clarification was provided.       Patient transported with:   [employer]

## 2019-09-26 NOTE — INTERVAL H&P NOTE
H&P Update: 
Barbi Bean was seen and examined. History and physical has been reviewed. The patient has been examined. There have been no significant clinical changes since the completion of the originally dated History and Physical. 
Patient identified by surgeon; surgical site was confirmed by patient and surgeon.

## 2019-09-27 VITALS
SYSTOLIC BLOOD PRESSURE: 108 MMHG | WEIGHT: 164 LBS | DIASTOLIC BLOOD PRESSURE: 64 MMHG | HEIGHT: 64 IN | TEMPERATURE: 99.4 F | HEART RATE: 78 BPM | OXYGEN SATURATION: 98 % | RESPIRATION RATE: 16 BRPM | BODY MASS INDEX: 28 KG/M2

## 2019-09-27 LAB
HCT VFR BLD AUTO: 35.1 % (ref 35.8–46.3)
HGB BLD-MCNC: 11.6 G/DL (ref 11.7–15.4)

## 2019-09-27 PROCEDURE — 74011250636 HC RX REV CODE- 250/636: Performed by: OBSTETRICS & GYNECOLOGY

## 2019-09-27 PROCEDURE — 36415 COLL VENOUS BLD VENIPUNCTURE: CPT

## 2019-09-27 PROCEDURE — 74011250637 HC RX REV CODE- 250/637: Performed by: OBSTETRICS & GYNECOLOGY

## 2019-09-27 PROCEDURE — 85018 HEMOGLOBIN: CPT

## 2019-09-27 RX ORDER — OXYCODONE HYDROCHLORIDE 5 MG/1
5 TABLET ORAL
Qty: 20 TAB | Refills: 0 | Status: SHIPPED | OUTPATIENT
Start: 2019-09-27 | End: 2019-10-02

## 2019-09-27 RX ORDER — IBUPROFEN 200 MG
800 TABLET ORAL
Qty: 30 TAB | Refills: 0 | Status: SHIPPED
Start: 2019-09-27

## 2019-09-27 RX ADMIN — KETOROLAC TROMETHAMINE 30 MG: 30 INJECTION, SOLUTION INTRAMUSCULAR at 08:56

## 2019-09-27 RX ADMIN — KETOROLAC TROMETHAMINE 30 MG: 30 INJECTION, SOLUTION INTRAMUSCULAR at 03:37

## 2019-09-27 RX ADMIN — DOCUSATE SODIUM 100 MG: 100 CAPSULE, LIQUID FILLED ORAL at 08:55

## 2019-09-27 RX ADMIN — OXYCODONE HYDROCHLORIDE 5 MG: 5 TABLET ORAL at 08:55

## 2019-09-27 NOTE — PROGRESS NOTES
Am assessment completed. Pt is alert and oriented x 4, Verbalizes needs well. 4 P sites c/d/i. Ambulating in room. Passed flatus. Pain managed. Continue to monitor.

## 2019-09-27 NOTE — DISCHARGE INSTRUCTIONS
DISCHARGE SUMMARY from Nurse    PATIENT INSTRUCTIONS:    After general anesthesia or intravenous sedation, for 24 hours or while taking prescription Narcotics:  · Limit your activities  · Do not drive and operate hazardous machinery  · Do not make important personal or business decisions  · Do  not drink alcoholic beverages  · If you have not urinated within 8 hours after discharge, please contact your surgeon on call. Report the following to your surgeon:  · Excessive pain, swelling, redness or odor of or around the surgical area  · Temperature over 100.5  · Nausea and vomiting lasting longer than 4 hours or if unable to take medications  · Any signs of decreased circulation or nerve impairment to extremity: change in color, persistent  numbness, tingling, coldness or increase pain  · Any questions    What to do at Home:  Recommended activity: Activity as tolerated, take short frequent walks several times a day to prevent blood clots, no strenuous exercise or heavy lifting; no sex, douching or tampons; no tub baths or swimming, may shower, keep incisions clean and dry. Regular diet, Colace or Miralax OTC for constipation. If you experience any of the following symptoms severe abdominal pain, nausea/vomiting lasting longer than 4 hours, fever (>101) or other s/s of wound infection, heavy vaginal bleeding (>1 bony pad/hour), please follow up with your surgeon. *  Please give a list of your current medications to your Primary Care Provider. *  Please update this list whenever your medications are discontinued, doses are      changed, or new medications (including over-the-counter products) are added. *  Please carry medication information at all times in case of emergency situations.     These are general instructions for a healthy lifestyle:    No smoking/ No tobacco products/ Avoid exposure to second hand smoke  Surgeon General's Warning:  Quitting smoking now greatly reduces serious risk to your health. Obesity, smoking, and sedentary lifestyle greatly increases your risk for illness    A healthy diet, regular physical exercise & weight monitoring are important for maintaining a healthy lifestyle    You may be retaining fluid if you have a history of heart failure or if you experience any of the following symptoms:  Weight gain of 3 pounds or more overnight or 5 pounds in a week, increased swelling in our hands or feet or shortness of breath while lying flat in bed. Please call your doctor as soon as you notice any of these symptoms; do not wait until your next office visit. Recognize signs and symptoms of STROKE:    F-face looks uneven    A-arms unable to move or move unevenly    S-speech slurred or non-existent    T-time-call 911 as soon as signs and symptoms begin-DO NOT go       Back to bed or wait to see if you get better-TIME IS BRAIN. Warning Signs of HEART ATTACK     Call 911 if you have these symptoms:   Chest discomfort. Most heart attacks involve discomfort in the center of the chest that lasts more than a few minutes, or that goes away and comes back. It can feel like uncomfortable pressure, squeezing, fullness, or pain.  Discomfort in other areas of the upper body. Symptoms can include pain or discomfort in one or both arms, the back, neck, jaw, or stomach.  Shortness of breath with or without chest discomfort.  Other signs may include breaking out in a cold sweat, nausea, or lightheadedness. Don't wait more than five minutes to call 911 - MINUTES MATTER! Fast action can save your life. Calling 911 is almost always the fastest way to get lifesaving treatment. Emergency Medical Services staff can begin treatment when they arrive -- up to an hour sooner than if someone gets to the hospital by car. The discharge information has been reviewed with the patient. The patient verbalized understanding.   Discharge medications reviewed with the patient and appropriate educational materials and side effects teaching were provided. ___________________________________________________________________________________________________________________________________    Patient Education        Laparoscopic Hysterectomy: What to Expect at Home  Your Recovery    A hysterectomy is surgery to take out the uterus. In some cases, the ovaries and fallopian tubes also are taken out at the same time. You can expect to feel better and stronger each day, but you may need pain medicine for a week or two. You may get tired easily or have less energy than usual. The tiredness may last for several weeks after surgery. You will probably notice that your belly is swollen and puffy. This is common. The swelling will take several weeks to go down. You may take about 4 to 6 weeks to fully recover. It is important to avoid lifting while you are recovering so that you can heal.  This care sheet gives you a general idea about how long it will take for you to recover. But each person recovers at a different pace. Follow the steps below to get better as quickly as possible. How can you care for yourself at home? Activity    · Rest when you feel tired.     · Be active. Walking is a good choice.     · Allow the area to heal. Don't move quickly or lift anything heavy until you are feeling better.     · You may shower 24 to 48 hours after surgery, if your doctor okays it. Pat the incision dry. Do not take a bath for the first 2 weeks, or until your doctor tells you it is okay.     · Ask your doctor when it is okay for you to have sex. Diet    · You can eat your normal diet. If your stomach is upset, try bland, low-fat foods like plain rice, broiled chicken, toast, and yogurt.     · If your bowel movements are not regular right after surgery, try to avoid constipation and straining. Drink plenty of water. Your doctor may suggest fiber, a stool softener, or a mild laxative.    Medicines    · Your doctor will tell you if and when you can restart your medicines. He or she will also give you instructions about taking any new medicines.     · If you take blood thinners, such as warfarin (Coumadin), clopidogrel (Plavix), or aspirin, be sure to talk to your doctor. He or she will tell you if and when to start taking those medicines again. Make sure that you understand exactly what your doctor wants you to do.     · Be safe with medicines. Read and follow all instructions on the label. ? If the doctor gave you a prescription medicine for pain, take it as prescribed. ? If you are not taking a prescription pain medicine, ask your doctor if you can take an over-the-counter medicine.     · If your doctor prescribed antibiotics, take them as directed. Do not stop taking them just because you feel better. You need to take the full course of antibiotics. Incision care    · You may have stitches over the cuts (incisions) the doctor made in your belly.     · If you have strips of tape on the cut (incision) the doctor made, leave the tape on for a week or until it falls off.     · Wash the area daily with warm, soapy water, and pat it dry. Don't use hydrogen peroxide or alcohol. They can slow healing.     · You may cover the area with a gauze bandage if it oozes fluid or rubs against clothing.     · Change the bandage every day. Other instructions    · You may have some light vaginal bleeding. Wear sanitary pads if needed. Do not douche or use tampons.     · Don't have sex until the doctor says it is okay. Follow-up care is a key part of your treatment and safety. Be sure to make and go to all appointments, and call your doctor if you are having problems. It's also a good idea to know your test results and keep a list of the medicines you take. When should you call for help? Call 911 anytime you think you may need emergency care.  For example, call if:    · You passed out (lost consciousness).     · You have chest pain, are short of breath, or cough up blood.    Call your doctor now or seek immediate medical care if:    · You have pain that does not get better after you take pain medicine.     · You cannot pass stools or gas.     · You have vaginal discharge that has increased in amount or smells bad.     · You are sick to your stomach or cannot drink fluids.     · You have loose stitches, or your incision comes open.     · Bright red blood has soaked through the bandage over your incision.     · You have signs of infection, such as:  ? Increased pain, swelling, warmth, or redness. ? Red streaks leading from the incision. ? Pus draining from the incision. ? A fever.     · You have bright red vaginal bleeding that soaks one or more pads in an hour, or you have large clots.     · You have signs of a blood clot in your leg (called a deep vein thrombosis), such as:  ? Pain in your calf, back of the knee, thigh, or groin. ? Redness and swelling in your leg or groin.    Watch closely for changes in your health, and be sure to contact your doctor if you have any problems. Where can you learn more? Go to http://andreina-jason.info/. Enter Q131 in the search box to learn more about \"Laparoscopic Hysterectomy: What to Expect at Home. \"  Current as of: February 19, 2019  Content Version: 12.2  © 5710-0448 [employer]. Care instructions adapted under license by [employer] (which disclaims liability or warranty for this information). If you have questions about a medical condition or this instruction, always ask your healthcare professional. [place] [address] any warranty or liability for your use of this information.

## 2019-09-27 NOTE — PROGRESS NOTES
Gynecology Progress Note    Patient doing well post-op day 1 from Procedure(s):  ROBOTIC TOTAL LAPAROSCOPIC HYSTERECTOMY/ BILATERAL SALPINGECTOMY WITH CYSTOSCOPY without significant complaints. Pain controlled on current medication. Voiding without difficulty. Patient is passing flatus. Vitals:  Blood pressure 108/64, pulse 78, temperature 99.4 °F (37.4 °C), resp. rate 16, height 5' 4\" (1.626 m), weight 164 lb (74.4 kg), SpO2 98 %, not currently breastfeeding. Temp (24hrs), Av.7 °F (37.1 °C), Min:97.8 °F (36.6 °C), Max:99.4 °F (37.4 °C)        Exam:  Patient without distress. Abdomen soft,  nontender. Incisions dry and clean without erythema. Lower extremities are negative for swelling, cords, or tenderness. Lab/Data Review:  CBC:   Lab Results   Component Value Date/Time    HGB 11.6 (L) 2019 05:00 AM    HCT 35.1 (L) 2019 05:00 AM       Assessment and Plan:  Patient appears to be having uncomplicated post Procedure(s):  ROBOTIC TOTAL LAPAROSCOPIC HYSTERECTOMY/ BILATERAL SALPINGECTOMY WITH CYSTOSCOPY course. Continue routine post-op care.   Plan discharge

## 2019-09-27 NOTE — DISCHARGE SUMMARY
Gynecology Discharge Summary     Name: Emilie Jaimes MRN: [id number]  SSN: [id number]    YOB: 1975  Age: 45 y.o. Sex: female      Allergies: Adhesive    Admit Date: 9/26/2019    Discharge Date: 9/27/2019      Admitting Physician: Laverle Severs, MD     Discharge Physician: Laverle Severs, MD     * Admission Diagnoses: Pelvic pain [R10.2]; Bilateral ovarian cysts [N83.201, N83.202]; Status post hysterectomy [Z90.710]    * Discharge Diagnoses:   Hospital Problems as of 9/27/2019 Date Reviewed: 9/17/2019          Codes Class Noted - Resolved POA    * (Principal) Status post hysterectomy ICD-10-CM: Z90.710  ICD-9-CM: V88.01  9/26/2019 - Present Yes               * Procedures: Robotic Assisted Total Laparoscopic Hysterectomy and Bilateral Salpingectomy    Consults: None    * Discharge Condition: good    * Hospital Course:   Normal hospital course for this procedure. * Discharge Disposition: Home    Discharge Medications:   Current Discharge Medication List      START taking these medications    Details   oxyCODONE IR (ROXICODONE) 5 mg immediate release tablet Take 1 Tab by mouth every four (4) hours as needed for Pain for up to 5 days. Max Daily Amount: 30 mg.  Qty: 20 Tab, Refills: 0    Associated Diagnoses: Status post hysterectomy      ibuprofen (MOTRIN IB) 200 mg tablet Take 4 Tabs by mouth every six (6) hours as needed for Pain. Qty: 30 Tab, Refills: 0              * Follow-up Care/Patient Instructions: Activity: No sex, douching, or tampons for 6 weeks or as directed by your physician. No heavy lifting for 6 weeks. No driving while taking pain medication.   Diet: Resume pre-hospital diet  Wound Care: Keep wound clean and dry    Follow-up Information     Follow up With Specialties Details Why Contact Info    Shyam Bueno MD [place] [id number] [facility],[address] Floor, [address]  [phone]

## 2019-09-27 NOTE — PROGRESS NOTES
Discharge instructions were reviewed with the patient. Opportunity for questions given. Patient verbalized understanding of discharge and follow up instructions, as well as S/S to report to MD or return to ER for. PIV was removed. Prescriptions provided. Patient will D/C to home. Pt states ride should be here at noon.

## 2019-10-17 ENCOUNTER — HOSPITAL ENCOUNTER (OUTPATIENT)
Dept: ULTRASOUND IMAGING | Age: 44
Discharge: HOME | End: 2019-10-17
Attending: OBSTETRICS & GYNECOLOGY
Payer: COMMERCIAL

## 2019-10-17 DIAGNOSIS — R07.9 CHEST PAIN, UNSPECIFIED TYPE: ICD-10-CM

## 2019-10-17 DIAGNOSIS — Z98.890 POST-OPERATIVE STATE: ICD-10-CM

## 2019-10-17 DIAGNOSIS — M79.604 RIGHT LEG PAIN: ICD-10-CM

## 2019-10-17 DIAGNOSIS — R06.02 SOB (SHORTNESS OF BREATH): ICD-10-CM

## 2019-10-17 PROCEDURE — 93971 EXTREMITY STUDY: CPT

## 2022-03-18 PROBLEM — Z90.49 S/P LAPAROSCOPIC CHOLECYSTECTOMY: Status: ACTIVE | Noted: 2018-07-24

## 2022-03-19 PROBLEM — Z90.710 STATUS POST HYSTERECTOMY: Status: ACTIVE | Noted: 2019-09-26

## 2023-03-07 NOTE — PROGRESS NOTES
Patient presents today for a routine gynecological examination with no complaints. OB History          4    Para        Term   2            AB   2    Living   2         SAB        IAB        Ectopic        Molar        Multiple        Live Births                  LAST PAP: 2019 negative, HPV negative    LAST MAMMO: 2019 negative    GYN History           No LMP recorded. Patient has had a hysterectomy. negative postcoital bleeding    Past Medical History:  Past Medical History:   Diagnosis Date    Acquired hypothyroidism     history of hypothyroidism, resolved after getting pregnancy    Heavy periods     History of ectopic pregnancy 2009    methotrexate       Past Surgical History:  Past Surgical History:   Procedure Laterality Date    APPENDECTOMY  2013    laparoscopic     SECTION  04/19/2011    x2    CHOLECYSTECTOMY, LAPAROSCOPIC  2018    DILATION AND CURETTAGE OF UTERUS      ENDOMETRIAL ABLATION  2017    for menorrhagia    GYN      Essure,  for fallopian tube birth control    HYSTERECTOMY (CERVIX STATUS UNKNOWN)  2019    OTHER SURGICAL HISTORY      nasal surgery    REFRACTIVE SURGERY Bilateral     SALPINGECTOMY  2019       Allergies: Allergies   Allergen Reactions    Adhesive Tape Rash     Surgical glue  Surgical glue         Medication History:  Current Outpatient Medications   Medication Sig Dispense Refill    ibuprofen (ADVIL;MOTRIN) 200 MG tablet Take 800 mg by mouth every 6 hours as needed       No current facility-administered medications for this visit. Social History:  Social History     Socioeconomic History    Marital status:      Spouse name: Not on file    Number of children: Not on file    Years of education: Not on file    Highest education level: Not on file   Occupational History    Not on file   Tobacco Use    Smoking status: Never    Smokeless tobacco: Never   Substance and Sexual Activity    Alcohol use: Yes    Drug use:  No Sexual activity: Not on file     Comment: hyst   Other Topics Concern    Not on file   Social History Narrative    [address]      2 boys       Social Determinants of Health     Financial Resource Strain: Not on file   Food Insecurity: Not on file   Transportation Needs: Not on file   Physical Activity: Not on file   Stress: Not on file   Social Connections: Not on file   Intimate Partner Violence: Not on file   Housing Stability: Not on file       Family History:  Family History   Problem Relation Age of Onset    No Known Problems Sister     No Known Problems Mother     Breast Cancer Paternal Aunt     Cancer Paternal Aunt         breast    No Known Problems Brother     Diabetes Father     Stroke Father     Heart Disease Father 58        had CABG- ?MI       Review of Systems - General ROS: negative except for that discussed in HPI      ROS:  Feeling well. No dyspnea or chest pain on exertion. No abdominal pain, change in bowel habits, black or bloody stools. No urinary tract symptoms. No neurological complaints. Objective:   /76 (Site: Left Upper Arm, Position: Sitting)   Ht 5' 4\" (1.626 m)   Wt 172 lb 9.6 oz (78.3 kg)   BMI 29.63 kg/m²   The patient appears well, alert, oriented x 3, in no distress. ENT normal.  Neck supple. No adenopathy or thyromegaly. Lungs:  clear, good air entry, no wheezes, rhonchi or rales. Heart:  S1 and S2 normal, no murmurs, regular rate and rhythm. Abdomen:  soft without tenderness, guarding, mass or organomegaly. Extremities show no edema, normal peripheral pulses. Neurological is normal, no focal findings.     BREAST EXAM: breasts appear normal, no suspicious masses, no skin or nipple changes or axillary nodes, symmetric fibrous changes bilaterally    PELVIC EXAM: VULVA: normal appearing vulva with no masses, tenderness or lesions, VAGINA: normal appearing vagina with normal color and discharge, no lesions, ADNEXA: normal adnexa in size, nontender and no masses    Assessment/Plan:     1. Screening mammogram for breast cancer    - [facility] MONA DIGITAL SCREEN BILATERAL; Future    2. Screening for genitourinary condition    - AMB POC URINALYSIS DIP STICK AUTO W/O MICRO    3. Well woman exam    - [facility] MONA DIGITAL SCREEN BILATERAL; Future  - AMB POC URINALYSIS DIP STICK AUTO W/O MICRO     mammogram  No pap smear per guidelines  Denies need for med refills   return annually or prn    Supervising physician is Dr. Deb Nuñez.     Curt Diallo, APRN - CNP

## 2023-03-08 ENCOUNTER — OFFICE VISIT (OUTPATIENT)
Dept: OBGYN CLINIC | Age: 48
End: 2023-03-08
Payer: COMMERCIAL

## 2023-03-08 VITALS
SYSTOLIC BLOOD PRESSURE: 114 MMHG | BODY MASS INDEX: 29.47 KG/M2 | WEIGHT: 172.6 LBS | DIASTOLIC BLOOD PRESSURE: 76 MMHG | HEIGHT: 64 IN

## 2023-03-08 DIAGNOSIS — Z01.419 WELL WOMAN EXAM: Primary | ICD-10-CM

## 2023-03-08 DIAGNOSIS — Z12.31 SCREENING MAMMOGRAM FOR BREAST CANCER: ICD-10-CM

## 2023-03-08 DIAGNOSIS — Z13.89 SCREENING FOR GENITOURINARY CONDITION: ICD-10-CM

## 2023-03-08 LAB
BILIRUBIN, URINE, POC: NEGATIVE
BLOOD URINE, POC: NEGATIVE
GLUCOSE URINE, POC: NEGATIVE
KETONES, URINE, POC: NEGATIVE
LEUKOCYTE ESTERASE, URINE, POC: NEGATIVE
NITRITE, URINE, POC: NEGATIVE
PH, URINE, POC: 6.5 (ref 4.6–8)
PROTEIN,URINE, POC: NEGATIVE
SPECIFIC GRAVITY, URINE, POC: 1.02 (ref 1–1.03)
URINALYSIS CLARITY, POC: CLEAR
URINALYSIS COLOR, POC: YELLOW
UROBILINOGEN, POC: NORMAL

## 2023-03-08 PROCEDURE — 99386 PREV VISIT NEW AGE 40-64: CPT | Performed by: NURSE PRACTITIONER

## 2023-03-08 PROCEDURE — 81003 URINALYSIS AUTO W/O SCOPE: CPT | Performed by: NURSE PRACTITIONER

## 2024-02-29 ENCOUNTER — HOSPITAL ENCOUNTER (OUTPATIENT)
Dept: ULTRASOUND IMAGING | Age: 49
Discharge: HOME | End: 2024-02-29
Payer: COMMERCIAL

## 2024-02-29 DIAGNOSIS — R10.31 RIGHT LOWER QUADRANT ABDOMINAL PAIN: ICD-10-CM

## 2024-02-29 PROCEDURE — 76856 US EXAM PELVIC COMPLETE: CPT

## 2024-07-31 NOTE — PROGRESS NOTES
The patient is a 48 y.o. who presents to the office for burning with urination, dyspareunia, and water like yellow vaginal discharge x 2 weeks. Admits to mild odor with irritation/burning. Denies flank pain/hematuria.  Denies bleeding with intercourse. Denies new sexual partner, use of new soaps/detergents, or OTC medication use. Has been  for 26 years. Went to [facility] last week. Was dx with UTI. Given Cefdinir 300mg BID x 5 days. Completed treatment with little/no improvement.  States at the end of May had a gum graft. Was given Amoxicillin for either 7 or 10 days. Completed prescription but on last day of antibiotic started noticing vaginal burning with discharge. Dentist prescribed her Flagyl BID x 7 days in June, which she states helped some.     HISTORY:    No LMP recorded. Patient has had a hysterectomy.    Current Outpatient Medications on File Prior to Visit   Medication Sig Dispense Refill    ibuprofen (ADVIL;MOTRIN) 200 MG tablet Take 4 tablets by mouth every 6 hours as needed       No current facility-administered medications on file prior to visit.       ROS:  Feeling well. No dyspnea or chest pain on exertion.  No abdominal pain, change in bowel habits, black or bloody stools.  No urinary tract symptoms. GYN ROS: see above.    PHYSICAL EXAM:  Blood pressure 124/80, height 1.626 m (5' 4\"), weight 81.2 kg (179 lb).    The patient appears well, alert, oriented x 3, in no distress.  Lungs are clear. Heart RRR, no murmurs. Abdomen soft without tenderness, guarding, mass or organomegaly.  Pelvic: VULVA: normal appearing vulva with no masses, tenderness or lesions, VAGINA: normal appearing vagina with moderate erythema and copious thin green discharge, no lesions.    ASSESSMENT:  Encounter Diagnoses   Name Primary?    Dyspareunia in female     Vaginal discharge     Dysuria Yes    Burning with urination     Screening for genitourinary condition     Screening examination for venereal disease     Screening for

## 2024-08-07 ENCOUNTER — OFFICE VISIT (OUTPATIENT)
Dept: OBGYN CLINIC | Age: 49
End: 2024-08-07
Payer: COMMERCIAL

## 2024-08-07 VITALS
WEIGHT: 179 LBS | HEIGHT: 64 IN | BODY MASS INDEX: 30.56 KG/M2 | DIASTOLIC BLOOD PRESSURE: 80 MMHG | SYSTOLIC BLOOD PRESSURE: 124 MMHG

## 2024-08-07 DIAGNOSIS — Z13.89 SCREENING FOR GENITOURINARY CONDITION: ICD-10-CM

## 2024-08-07 DIAGNOSIS — R30.0 DYSURIA: Primary | ICD-10-CM

## 2024-08-07 DIAGNOSIS — N94.10 DYSPAREUNIA IN FEMALE: ICD-10-CM

## 2024-08-07 DIAGNOSIS — Z11.59 SCREENING FOR VIRAL AND CHLAMYDIAL DISEASES: ICD-10-CM

## 2024-08-07 DIAGNOSIS — Z11.8 SCREENING FOR VIRAL AND CHLAMYDIAL DISEASES: ICD-10-CM

## 2024-08-07 DIAGNOSIS — N89.8 VAGINAL DISCHARGE: ICD-10-CM

## 2024-08-07 DIAGNOSIS — R30.0 BURNING WITH URINATION: ICD-10-CM

## 2024-08-07 DIAGNOSIS — Z11.3 SCREENING EXAMINATION FOR VENEREAL DISEASE: ICD-10-CM

## 2024-08-07 DIAGNOSIS — R82.90 ABNORMAL URINALYSIS: ICD-10-CM

## 2024-08-07 LAB
BILIRUBIN, URINE, POC: NEGATIVE
BLOOD URINE, POC: NEGATIVE
GLUCOSE URINE, POC: NEGATIVE
KETONES, URINE, POC: NEGATIVE
LEUKOCYTE ESTERASE, URINE, POC: NORMAL
NITRITE, URINE, POC: NEGATIVE
PH, URINE, POC: 6 (ref 4.6–8)
PROTEIN,URINE, POC: NEGATIVE
SPECIFIC GRAVITY, URINE, POC: 1.01 (ref 1–1.03)
URINALYSIS CLARITY, POC: CLEAR
URINALYSIS COLOR, POC: YELLOW
UROBILINOGEN, POC: NORMAL

## 2024-08-07 PROCEDURE — 99214 OFFICE O/P EST MOD 30 MIN: CPT | Performed by: NURSE PRACTITIONER

## 2024-08-07 PROCEDURE — 81002 URINALYSIS NONAUTO W/O SCOPE: CPT | Performed by: NURSE PRACTITIONER

## 2024-08-07 RX ORDER — NITROFURANTOIN 25; 75 MG/1; MG/1
100 CAPSULE ORAL 2 TIMES DAILY
Qty: 10 CAPSULE | Refills: 0 | Status: SHIPPED | OUTPATIENT
Start: 2024-08-07 | End: 2024-08-12

## 2024-08-09 LAB
BACTERIA SPEC CULT: NORMAL
SERVICE CMNT-IMP: NORMAL

## 2024-08-10 LAB
A VAGINAE DNA VAG QL NAA+PROBE: ABNORMAL SCORE
BVAB2 DNA VAG QL NAA+PROBE: ABNORMAL SCORE
C ALBICANS DNA VAG QL NAA+PROBE: NEGATIVE
C GLABRATA DNA VAG QL NAA+PROBE: NEGATIVE
C TRACH RRNA SPEC QL NAA+PROBE: NEGATIVE
MEGA1 DNA VAG QL NAA+PROBE: ABNORMAL SCORE
N GONORRHOEA RRNA SPEC QL NAA+PROBE: NEGATIVE
SPECIMEN SOURCE: ABNORMAL
T VAGINALIS RRNA SPEC QL NAA+PROBE: POSITIVE

## 2024-08-12 ENCOUNTER — TELEPHONE (OUTPATIENT)
Dept: OBGYN CLINIC | Age: 49
End: 2024-08-12

## 2024-08-12 RX ORDER — METRONIDAZOLE 500 MG/1
500 TABLET ORAL 2 TIMES DAILY
Qty: 14 TABLET | Refills: 0 | Status: SHIPPED | OUTPATIENT
Start: 2024-08-12 | End: 2024-08-19

## 2024-08-12 NOTE — TELEPHONE ENCOUNTER
----- Message from EBEN Crump - CNP sent at 8/12/2024  7:49 AM EDT -----  Pos BV and trich- flagyl 500 BID x7 days  Partner needs tx  [facility] 6 weeks  No intercourse until 1 week after both have completed tx

## 2024-08-12 NOTE — TELEPHONE ENCOUNTER
Spoke with patient about vaginal swab and urine culture results.  Pt notified swab was negative for yeast, gc/chl. Positive for BV and trichomonas. Pt is .   Informed patient this is a sexually transmitted infection.  Both her and her partner would need to be treated. Partner to follow up with PCP. Informed patient that both should start treatment at the same time, avoid intercourse during treatment and for 7 days following treatment completion. [facility] appt scheduled.  Informed patient there are always false positives.  If partner is screened and is negative we would be happy to bring back in for repeat testing. Pt v/u. All questions answered.       Orders Placed This Encounter    metroNIDAZOLE (FLAGYL) 500 MG tablet     Sig: Take 1 tablet by mouth in the morning and at bedtime for 7 days     Dispense:  14 tablet     Refill:  0

## 2024-12-16 NOTE — PROGRESS NOTES
6/1/2022      Kike Barnes  [id number] [facility] [address]      Dear Kike    Thank you for choosing [facility] for your CT Calcium screening. Below is an explanation of the results as well as follow up recommendations from your screening.     Findings:    Your calcium score is 0. There is no identifiable coronary plaque, however a heart healthy lifestyle is recommended to prevent coronary artery disease     If you provided us with the name of your primary care physician or cardiologist, a copy of the enclosed results will be sent to them.  If your score is abnormal, you may need further testing and treatment.    If you have any questions, please call our Heart  at,   [phone].       Sincerely,    [facility]   HPI:  Ms. Ohara is a 49 y.o. female who is here today for a well woman exam. She complains of vaginal irritation, painful intercourse and burning with urination. Symptoms started a few weeks ago. In August she saw Kait for similar concerns and Nuswab was positive for BV and Trichomonas, was prescribed Flagyl and Macrobid. Pt did not have [facility]. Per patient  was treated.     Had been better then restarted    Allergies   Allergen Reactions    Adhesive Tape Rash     Surgical glue  Surgical glue         Current Outpatient Medications   Medication Sig Dispense Refill    fluconazole (DIFLUCAN) 150 MG tablet Take 1 tablet by mouth every 72 hours for 6 days 2 tablet 0    ibuprofen (ADVIL;MOTRIN) 200 MG tablet Take 4 tablets by mouth every 6 hours as needed (Patient not taking: Reported on 2024)       No current facility-administered medications for this visit.       Past Medical History:   Diagnosis Date    Acquired hypothyroidism     history of hypothyroidism, resolved after getting pregnancy    Heavy periods     History of ectopic pregnancy     methotrexate    Trichomonas infection 2024     Past Surgical History:   Procedure Laterality Date    APPENDECTOMY  2013    laparoscopic     SECTION  04/19/2011    x2    CHOLECYSTECTOMY, LAPAROSCOPIC  2018    DILATION AND CURETTAGE OF UTERUS      ENDOMETRIAL ABLATION  2017    for menorrhagia    GYN      Essure,  for fallopian tube birth control    HYSTERECTOMY (CERVIX STATUS UNKNOWN)  2019    OTHER SURGICAL HISTORY  2009    nasal surgery    REFRACTIVE SURGERY Bilateral     SALPINGECTOMY  2019     Social History     Socioeconomic History    Marital status:      Spouse name: Not on file    Number of children: Not on file    Years of education: Not on file    Highest education level: Not on file   Occupational History    Not on file   Tobacco Use    Smoking status: Never    Smokeless tobacco: Never   Vaping Use    Vaping status:

## 2024-12-17 ENCOUNTER — OFFICE VISIT (OUTPATIENT)
Dept: OBGYN CLINIC | Age: 49
End: 2024-12-17
Payer: COMMERCIAL

## 2024-12-17 VITALS
DIASTOLIC BLOOD PRESSURE: 80 MMHG | SYSTOLIC BLOOD PRESSURE: 118 MMHG | HEIGHT: 64 IN | WEIGHT: 184.9 LBS | BODY MASS INDEX: 31.57 KG/M2

## 2024-12-17 DIAGNOSIS — Z12.31 VISIT FOR SCREENING MAMMOGRAM: Primary | ICD-10-CM

## 2024-12-17 DIAGNOSIS — Z01.419 WELL WOMAN EXAM: ICD-10-CM

## 2024-12-17 DIAGNOSIS — N76.1 SUBACUTE VAGINITIS: ICD-10-CM

## 2024-12-17 DIAGNOSIS — N89.8 VAGINAL IRRITATION: ICD-10-CM

## 2024-12-17 DIAGNOSIS — Z13.89 SCREENING FOR GENITOURINARY CONDITION: ICD-10-CM

## 2024-12-17 LAB
BILIRUBIN, URINE, POC: NEGATIVE
BLOOD URINE, POC: NEGATIVE
GLUCOSE URINE, POC: NEGATIVE
KETONES, URINE, POC: NEGATIVE
LEUKOCYTE ESTERASE, URINE, POC: NORMAL
NITRITE, URINE, POC: NEGATIVE
PH, URINE, POC: 6 (ref 4.6–8)
PROTEIN,URINE, POC: NEGATIVE
SPECIFIC GRAVITY, URINE, POC: 1.02 (ref 1–1.03)
URINALYSIS CLARITY, POC: CLEAR
URINALYSIS COLOR, POC: YELLOW
UROBILINOGEN, POC: NORMAL MG/DL

## 2024-12-17 PROCEDURE — 99396 PREV VISIT EST AGE 40-64: CPT | Performed by: OBSTETRICS & GYNECOLOGY

## 2024-12-17 PROCEDURE — 81002 URINALYSIS NONAUTO W/O SCOPE: CPT | Performed by: OBSTETRICS & GYNECOLOGY

## 2024-12-17 PROCEDURE — 99459 PELVIC EXAMINATION: CPT | Performed by: OBSTETRICS & GYNECOLOGY

## 2024-12-17 RX ORDER — FLUCONAZOLE 150 MG/1
150 TABLET ORAL
Qty: 2 TABLET | Refills: 0 | Status: SHIPPED | OUTPATIENT
Start: 2024-12-17 | End: 2024-12-23

## 2024-12-20 LAB
A VAGINAE DNA VAG QL NAA+PROBE: ABNORMAL SCORE
BVAB2 DNA VAG QL NAA+PROBE: ABNORMAL SCORE
C ALBICANS DNA VAG QL NAA+PROBE: POSITIVE
C GLABRATA DNA VAG QL NAA+PROBE: NEGATIVE
C TRACH RRNA SPEC QL NAA+PROBE: NEGATIVE
MEGA1 DNA VAG QL NAA+PROBE: ABNORMAL SCORE
N GONORRHOEA RRNA SPEC QL NAA+PROBE: NEGATIVE
SPECIMEN SOURCE: ABNORMAL
T VAGINALIS RRNA SPEC QL NAA+PROBE: NEGATIVE

## 2025-01-20 NOTE — PROGRESS NOTES
The patient is a 49 y.o.  who is seen for yeast infection.     HISTORY:    No LMP recorded. Patient has had a hysterectomy.  Sexual History:  has sex with males  Contraception:  status post hysterectomy  Current Outpatient Medications on File Prior to Visit   Medication Sig Dispense Refill    ibuprofen (ADVIL;MOTRIN) 200 MG tablet Take 4 tablets by mouth every 6 hours as needed (Patient not taking: Reported on 2024)       No current facility-administered medications on file prior to visit.       ROS:  Feeling well. No dyspnea or chest pain on exertion.  No abdominal pain, change in bowel habits, black or bloody stools.  No urinary tract symptoms. GYN ROS: {gyn ros:667830}.    PHYSICAL EXAM:  There were no vitals taken for this visit.    The patient appears well, alert, oriented x 3, in no distress.  Lungs are clear. Heart RRR, no murmurs. Abdomen soft without tenderness, guarding, mass or organomegaly.  Pelvic: {pelvic exam:126625::\"normal external genitalia, vulva, vagina, cervix, uterus and adnexa\"}.    ASSESSMENT:  No diagnosis found.    PLAN:  {Gyn plan:05465}  No orders of the defined types were placed in this encounter.        Supervising physician is Dr. Moss.   *** minutes spent in chart review, exam, counseling and documentation.

## 2025-01-22 ENCOUNTER — OFFICE VISIT (OUTPATIENT)
Dept: OBGYN CLINIC | Age: 50
End: 2025-01-22
Payer: COMMERCIAL

## 2025-01-22 VITALS
BODY MASS INDEX: 31.72 KG/M2 | SYSTOLIC BLOOD PRESSURE: 112 MMHG | WEIGHT: 185.8 LBS | DIASTOLIC BLOOD PRESSURE: 82 MMHG | HEIGHT: 64 IN

## 2025-01-22 DIAGNOSIS — N89.8 VAGINAL ITCHING: ICD-10-CM

## 2025-01-22 DIAGNOSIS — N76.1 SUBACUTE VAGINITIS: Primary | ICD-10-CM

## 2025-01-22 DIAGNOSIS — N83.201 RIGHT OVARIAN CYST: ICD-10-CM

## 2025-01-22 DIAGNOSIS — Z13.89 SCREENING FOR GENITOURINARY CONDITION: ICD-10-CM

## 2025-01-22 DIAGNOSIS — R39.89 BLADDER PAIN: ICD-10-CM

## 2025-01-22 PROCEDURE — 99214 OFFICE O/P EST MOD 30 MIN: CPT | Performed by: OBSTETRICS & GYNECOLOGY

## 2025-01-22 RX ORDER — FLUCONAZOLE 100 MG/1
100 TABLET ORAL DAILY
Qty: 8 TABLET | Refills: 0 | Status: SHIPPED | OUTPATIENT
Start: 2025-01-22 | End: 2025-01-29

## 2025-01-22 NOTE — PROGRESS NOTES
The patient is a 49 y.o.  who is seen for yeast infection. Patient reports symptoms have alleviated outside of abdominal pain, especially in the last 3 days.Reports intercourse last week was incredibly uncomfortable, but this week was better and the discomfort last week was the main reason for her more recent concerns. Denies vaginal odor/discharge.na    Got better after treated last time then episode of discharge and pain then better     Abdominal pain couple times a week couple sec to minute and goes done leg started done year ago      HISTORY:    No LMP recorded. Patient has had a hysterectomy.  Sexual History:  has sex with males  Contraception:  status post hysterectomy  Current Outpatient Medications on File Prior to Visit   Medication Sig Dispense Refill    ibuprofen (ADVIL;MOTRIN) 200 MG tablet Take 4 tablets by mouth every 6 hours as needed       No current facility-administered medications on file prior to visit.           PHYSICAL EXAM:  Blood pressure 112/82, height 1.626 m (5' 4\"), weight 84.3 kg (185 lb 12.8 oz).    The patient appears well, alert, oriented x 3, in no distress.  Lungs are clear. Heart RRR, no murmurs. Abdomen soft without tenderness, guarding, mass or organomegaly.  Pelvic: normal external genitalia, vulva, vagina, cervix, uterus and adnexa, Normal external genitalia, normal hair distribution, no lesions.  Urethral meatus normal without lesions or prolapse.  Urethra palpates normally with no masses.  Bladder shows no tenderness on bimanual exam.  Vagina appears normal no evidence of significant prolapse.    No adnexal masses or tenderness.  Anus and perineum appear normal.  She is mildly tender on her bladder.  Vaginal discharge is consistent with yeast.  .    ASSESSMENT:  Encounter Diagnoses   Name Primary?    Subacute vaginitis Yes    Vaginal itching     Screening for genitourinary condition     Bladder pain     Right ovarian cyst        PLAN:  Discussed with patient at

## 2025-01-24 LAB
BACTERIA SPEC CULT: NORMAL
SERVICE CMNT-IMP: NORMAL

## 2025-02-03 NOTE — PROGRESS NOTES
Jennifer  is a 49 y.o. female, , No LMP recorded. Patient has had a hysterectomy.,  who presents to the office for gyn ultrasound and visit to evaluate for pelvic pain and history of ovarian cyst. Pt was seen on 25 for vaginitis. Nuswab was positive for yeast. Treated with Diflucan 200mg x 1 day then 100mg daily x 7 days. Urine culture was negative for infection.    States pain is located in centralized lower abdomen. Denies any constipation, urinary urgency, frequency, incontinence, or fever.    This is continued to be bothersome for her.  She also still has dyspareunia.    Pt had Robotic Assisted Total Hysterectomy with BS on 2019.    Ultrasound Findings Today:  Transvag Gyn- pelvic pain, h/o hyserectomy, h/o ovarian cysts   Cx and Ut surgically removed   ROV- probable clc 1.4 x 0.9 x 1.7 cm   LOV- calcs noted and heterogeneous, o/w WNL   Bilateral adnexas appear WNL with prominent loops of bowel   No free fluid present   Pt very tender during Tv exam       HISTORY:    Current Outpatient Medications on File Prior to Visit   Medication Sig Dispense Refill    ibuprofen (ADVIL;MOTRIN) 200 MG tablet Take 4 tablets by mouth every 6 hours as needed       No current facility-administered medications on file prior to visit.       ROS:  Review of Systems     Jennifer  has a past medical history of Acquired hypothyroidism, Heavy periods, History of ectopic pregnancy, and Trichomonas infection. .    Previous surgeries include  has a past surgical history that includes other surgical history (); Appendectomy (2013); salpingectomy (2019); Hysterectomy (2019); Cholecystectomy, laparoscopic (); Dilation and curettage of uterus; gyn;  section (2011); Endometrial ablation (2017); and Refractive surgery (Bilateral)..    Her current meds are   Current Outpatient Medications:     ibuprofen (ADVIL;MOTRIN) 200 MG tablet, Take 4 tablets by mouth every 6 hours as needed, Disp: , Rfl:

## 2025-02-04 ENCOUNTER — PROCEDURE VISIT (OUTPATIENT)
Dept: OBGYN CLINIC | Age: 50
End: 2025-02-04
Payer: COMMERCIAL

## 2025-02-04 ENCOUNTER — OFFICE VISIT (OUTPATIENT)
Dept: OBGYN CLINIC | Age: 50
End: 2025-02-04
Payer: COMMERCIAL

## 2025-02-04 VITALS
DIASTOLIC BLOOD PRESSURE: 78 MMHG | HEIGHT: 64 IN | BODY MASS INDEX: 31.4 KG/M2 | WEIGHT: 183.9 LBS | SYSTOLIC BLOOD PRESSURE: 114 MMHG

## 2025-02-04 DIAGNOSIS — N83.201 RIGHT OVARIAN CYST: ICD-10-CM

## 2025-02-04 DIAGNOSIS — Z87.42 HISTORY OF OVARIAN CYST: ICD-10-CM

## 2025-02-04 DIAGNOSIS — R39.89 BLADDER PAIN: Primary | ICD-10-CM

## 2025-02-04 DIAGNOSIS — Z90.710 HISTORY OF HYSTERECTOMY: ICD-10-CM

## 2025-02-04 DIAGNOSIS — R10.2 PELVIC PAIN: Primary | ICD-10-CM

## 2025-02-04 LAB
BILIRUBIN, URINE, POC: NEGATIVE
BLOOD URINE, POC: NEGATIVE
GLUCOSE URINE, POC: NEGATIVE
KETONES, URINE, POC: NEGATIVE
LEUKOCYTE ESTERASE, URINE, POC: NEGATIVE
NITRITE, URINE, POC: NEGATIVE
PH, URINE, POC: 7 (ref 4.6–8)
PROTEIN,URINE, POC: NEGATIVE
SPECIFIC GRAVITY, URINE, POC: 1.02 (ref 1–1.03)
URINALYSIS CLARITY, POC: CLEAR
URINALYSIS COLOR, POC: YELLOW
UROBILINOGEN, POC: NORMAL MG/DL

## 2025-02-04 PROCEDURE — 81002 URINALYSIS NONAUTO W/O SCOPE: CPT | Performed by: OBSTETRICS & GYNECOLOGY

## 2025-02-04 PROCEDURE — 99214 OFFICE O/P EST MOD 30 MIN: CPT | Performed by: OBSTETRICS & GYNECOLOGY

## 2025-02-04 PROCEDURE — 76830 TRANSVAGINAL US NON-OB: CPT | Performed by: OBSTETRICS & GYNECOLOGY

## (undated) DEVICE — [HIGH FLOW INSUFFLATOR,  DO NOT USE IF PACKAGE IS DAMAGED,  KEEP DRY,  KEEP AWAY FROM SUNLIGHT,  PROTECT FROM HEAT AND RADIOACTIVE SOURCES.]: Brand: PNEUMOSURE

## (undated) DEVICE — HANDPIECE ABLAT DIA6MM ENDOMET IMPED CTRL DEV DISP NOVASURE

## (undated) DEVICE — MEGA NEEDLE DRIVER: Brand: ENDOWRIST

## (undated) DEVICE — 40585 XL ADVANCED TRENDELENBURG POSITIONING KIT: Brand: 40585 XL ADVANCED TRENDELENBURG POSITIONING KIT

## (undated) DEVICE — VESSEL SEALER XI EXTENDED DISP -- VESSEL

## (undated) DEVICE — 2, DISPOSABLE SUCTION/IRRIGATOR WITHOUT DISPOSABLE TIP: Brand: STRYKEFLOW

## (undated) DEVICE — JELLY LUBRICATING 10GM PREFIL SYR LUBE

## (undated) DEVICE — DRAPE XR C ARM 41X74IN LF --

## (undated) DEVICE — CARDINAL HEALTH FLEXIBLE LIGHT HANDLE COVER: Brand: CARDINAL HEALTH

## (undated) DEVICE — SUTURE SZ 0 27IN 5/8 CIR UR-6  TAPER PT VIOLET ABSRB VICRYL J603H

## (undated) DEVICE — SYR BULB 60ML IRRIGATION -- CONVERT TO ITEM 116413

## (undated) DEVICE — STANDARD HYPODERMIC NEEDLE,POLYPROPYLENE HUB: Brand: MONOJECT

## (undated) DEVICE — DRAPE,TOP,102X53,STERILE: Brand: MEDLINE

## (undated) DEVICE — SOLUTION IRRIG 1000ML H2O STRL BLT

## (undated) DEVICE — DRAPE,UNDERBUTTOCKS,PCH,STERILE: Brand: MEDLINE

## (undated) DEVICE — BLADELESS OBTURATOR: Brand: WECK VISTA

## (undated) DEVICE — CYSTO: Brand: MEDLINE INDUSTRIES, INC.

## (undated) DEVICE — SUTURE VCRL SZ 0 L36IN ABSRB UD L36MM CT-1 1/2 CIR J946H

## (undated) DEVICE — SUTURE MCRYL SZ 4-0 L18IN ABSRB UD L19MM PS-2 3/8 CIR PRIM Y496G

## (undated) DEVICE — SOLUTION IRRIG 3000ML 0.9% SOD CHL FLX CONT 0797208] ICU MEDICAL INC]

## (undated) DEVICE — APPLIER CLP M/L SHFT DIA5MM 15 LIG LIGAMAX 5

## (undated) DEVICE — APPLICATOR BNDG 1MM ADH PREMIERPRO EXOFIN

## (undated) DEVICE — 2000CC GUARDIAN II: Brand: GUARDIAN

## (undated) DEVICE — DRAPE SHT 3 QTR PROXIMA 53X77 --

## (undated) DEVICE — SYR 10ML LUER LOK 1/5ML GRAD --

## (undated) DEVICE — APPLICATOR FBR TIP L6IN COT TIP WOOD SHFT SWAB 2000 PER CA

## (undated) DEVICE — DRAPE TWL SURG 16X26IN BLU ORB04] ALLCARE INC]

## (undated) DEVICE — BLUNT TROCAR WITH THREADED ANCHOR: Brand: VERSAONE

## (undated) DEVICE — UNIVERSAL FIXATION CANNULA: Brand: VERSAONE

## (undated) DEVICE — ELECTRO LUBE IS A SINGLE PATIENT USE DEVICE THAT IS INTENDED TO BE USED ON ELECTROSURGICAL ELECTRODES TO REDUCE STICKING.: Brand: KEY SURGICAL ELECTRO LUBE

## (undated) DEVICE — SUTURE ENDOLOOP SZ 0 L18IN ABSRB VLT LIG SLDE KNOT VCRL

## (undated) DEVICE — SOL ANTI-FOG 6ML MEDC -- MEDICHOICE - CONVERT TO 358427

## (undated) DEVICE — LAP CHOLE: Brand: MEDLINE INDUSTRIES, INC.

## (undated) DEVICE — NEEDLE HYPO 21GA L1.5IN INTRAMUSCULAR S STL LATCH BVL UP

## (undated) DEVICE — AIRSEAL 5 MM ACCESS PORT AND LOW PROFILE OBTURATOR WITH BLADELESS OPTICAL TIP, 120 MM LENGTH: Brand: AIRSEAL

## (undated) DEVICE — GOWN,REINF,POLY,ECL,PP SLV,XL: Brand: MEDLINE

## (undated) DEVICE — REM POLYHESIVE ADULT PATIENT RETURN ELECTRODE: Brand: VALLEYLAB

## (undated) DEVICE — BAG DRNGE 4000ML CONT IRRIG ROUNDED TEARDROP SHP DISP

## (undated) DEVICE — SUTURE V-LOC 180 SZ 0 L12IN ABSRB GRN L37MM GS-21 1/2 CIR VLOCL0316

## (undated) DEVICE — COBRA GRASPER: Brand: ENDOWRIST

## (undated) DEVICE — SPONGE LAP 18X18IN STRL -- 5/PK

## (undated) DEVICE — CONTAINER SPEC FRMLN 120ML --

## (undated) DEVICE — BLADELESS OPTICAL TROCAR WITH FIXATION CANNULA: Brand: VERSAPORT

## (undated) DEVICE — SOLUTION IV 1000ML 0.9% SOD CHL

## (undated) DEVICE — SOLUTION LACTATED RINGERS INJECTION USP

## (undated) DEVICE — CONTAINER SPEC HISTOLOGY 900ML POLYPR

## (undated) DEVICE — SHEARS ENDOSCP L36CM DIA5MM ULTRASONIC CRV TIP W/ ADV

## (undated) DEVICE — TRAY PREP DRY W/ PREM GLV 2 APPL 6 SPNG 2 UNDPD 1 OVERWRAP

## (undated) DEVICE — COVER,TABLE,44X76,STERILE: Brand: MEDLINE

## (undated) DEVICE — (D)PREP SKN CHLRAPRP APPL 26ML -- CONVERT TO ITEM 371833

## (undated) DEVICE — AMD ANTIMICROBIAL NON-ADHERENT PAD,0.2% POLYHEXAMETHYLENE BIGUANIDE HCI (PHMB): Brand: TELFA

## (undated) DEVICE — LOGICUT SCISSOR LENGTH 320MM: Brand: LOGI - LAPAROSCOPIC INSTRUMENT SYSTEM

## (undated) DEVICE — FILTER SMK EVAC FLO CLR MEGADYNE

## (undated) DEVICE — COLUMN DRAPE

## (undated) DEVICE — SCISSORS SURG DIA8MM MPLR CRV ENDOWRIST

## (undated) DEVICE — INSUFFLATION NEEDLE TO ESTABLISH PNEUMOPERITONEUM.: Brand: INSUFFLATION NEEDLE

## (undated) DEVICE — PERI-PAD,MODERATE: Brand: CURITY

## (undated) DEVICE — SEAL UNIV 5-8MM DISP BX/10 -- DA VINCI XI - SNGL USE

## (undated) DEVICE — GARMENT,MEDLINE,DVT,INT,CALF,MED, GEN2: Brand: MEDLINE

## (undated) DEVICE — CATHETER URETH 16FR BLLN 5CC SIL ALLY W/ SIL HYDRGEL 2 W F

## (undated) DEVICE — BUTTON SWITCH PENCIL BLADE ELECTRODE, HOLSTER: Brand: EDGE

## (undated) DEVICE — CONTROL SYRINGE LUER-LOCK TIP: Brand: MONOJECT

## (undated) DEVICE — SUTURE MCRYL SZ 4-0 L27IN ABSRB UD L19MM PS-2 1/2 CIR PRIM Y426H

## (undated) DEVICE — ARM DRAPE

## (undated) DEVICE — TRI-LUMEN FILTERED TUBE SET WITH ACTIVATED CHARCOAL FILTER: Brand: AIRSEAL

## (undated) DEVICE — VISUALIZATION SYSTEM: Brand: CLEARIFY

## (undated) DEVICE — KENDALL SCD EXPRESS SLEEVES, KNEE LENGTH, MEDIUM: Brand: KENDALL SCD

## (undated) DEVICE — VCARE MEDIUM, UTERINE MANIPULATOR, VAGINAL-CERVICAL-AHLUWALIA'S-RETRACTOR-ELEVATOR: Brand: VCARE

## (undated) DEVICE — DRAPE,LAP,CHOLE,W/TROUGHS,STERILE: Brand: MEDLINE

## (undated) DEVICE — APPLICATOR COT TIP 6IN WOOD --

## (undated) DEVICE — LEGGINGS, PAIR, 31X48, STERILE: Brand: MEDLINE

## (undated) DEVICE — ENDOLOOP SUT LIGATURE 18IN -- 12/BX PDS II

## (undated) DEVICE — TIP COVER ACCESSORY